# Patient Record
Sex: MALE | Race: WHITE | Employment: FULL TIME | URBAN - METROPOLITAN AREA
[De-identification: names, ages, dates, MRNs, and addresses within clinical notes are randomized per-mention and may not be internally consistent; named-entity substitution may affect disease eponyms.]

---

## 2017-01-17 ENCOUNTER — OFFICE VISIT (OUTPATIENT)
Dept: CARDIOLOGY CLINIC | Age: 42
End: 2017-01-17

## 2017-01-17 ENCOUNTER — CLINICAL SUPPORT (OUTPATIENT)
Dept: CARDIOLOGY CLINIC | Age: 42
End: 2017-01-17

## 2017-01-17 VITALS
HEIGHT: 71 IN | WEIGHT: 315 LBS | OXYGEN SATURATION: 96 % | SYSTOLIC BLOOD PRESSURE: 120 MMHG | RESPIRATION RATE: 18 BRPM | HEART RATE: 68 BPM | DIASTOLIC BLOOD PRESSURE: 58 MMHG | BODY MASS INDEX: 44.1 KG/M2

## 2017-01-17 DIAGNOSIS — R07.89 CHEST TIGHTNESS OR PRESSURE: ICD-10-CM

## 2017-01-17 DIAGNOSIS — I10 ESSENTIAL HYPERTENSION: ICD-10-CM

## 2017-01-17 DIAGNOSIS — R42 DIZZINESS: Primary | ICD-10-CM

## 2017-01-17 DIAGNOSIS — R42 DIZZINESS: ICD-10-CM

## 2017-01-17 NOTE — MR AVS SNAPSHOT
Visit Information Date & Time Provider Department Dept. Phone Encounter #  
 1/17/2017  3:15 PM Susanna Zamarripa, [address] [facility] [phone] [id number] Follow-up Instructions Routing History Follow-up and Disposition History Your Appointments 1/19/2017  3:30 PM  
ECHO CARDIOGRAMS 2D with ECHO, [facility]) Appt Note: $40CP REM Per Dr Italia Houser 5'11\", 331. 11LBS 2D ECHO COMPLETE ADULT (TTE) W OR WO CONTR [55371 CPT(R)] EKG TREADMILL STRESS TEST [32018 CPT(R)]  
 [facility]  
[phone] [address]  
  
    
 1/27/2017  3:30 PM  
STRESS TEST with STRESSECHO, [facility]) Appt Note: $40CP REM Per Dr Italia Houser 5'11\", 331. 11LBS 2D ECHO COMPLETE ADULT (TTE) W OR WO CONTR [67845 CPT(R)] EKG TREADMILL STRESS TEST [62412 CPT(R)]  
 [facility] [facility]  
[phone] [address]  
  
    
 3/10/2017  9:00 AM  
ROUTINE CARE with Ale Parker MD  
[facility]) Appt Note: 3m f/u  
 [address] [id number] [phone] [address] [facility] [address] Upcoming Health Maintenance Date Due DTaP/Tdap/Td series (2 - Td) 9/12/2024 Allergies as of 1/17/2017  Review Complete On: 1/17/2017 By: Susanna Zamarripa MD  
  
 Severity Noted Reaction Type Reactions Lisinopril  05/29/2013   Side Effect Other (comments)  
 dizziness Current Immunizations  Reviewed on 12/1/2014 Name Date Influenza Vaccine 10/21/2016 Influenza Vaccine (Quad) PF 12/11/2015, 12/1/2014 Influenza Vaccine Split 9/21/2012 TD Vaccine 7/25/2011 11:09 PM  
  
 Not reviewed this visit You Were Diagnosed With   
  
 Codes Comments Dizziness    -  Primary ICD-10-CM: W61 ICD-9-CM: 780.4 Essential hypertension     ICD-10-CM: I10 
ICD-9-CM: 401.9 Chest tightness or pressure     ICD-10-CM: R07.89 ICD-9-CM: 786.59 Vitals BP Pulse Resp Height(growth percentile) Weight(growth percentile) SpO2  
 120/58 (BP 1 Location: Right arm, BP Patient Position: Standing) 68 18 5' 11\" (1.803 m) 331 lb 11.2 oz (150.5 kg) 96% BMI Smoking Status 46.26 kg/m2 Former Smoker Vitals History BMI and BSA Data Body Mass Index Body Surface Area  
 46.26 kg/m 2 2.75 m 2 Preferred Pharmacy Pharmacy Name Phone [id number] [address] [phone] Your Updated Medication List  
  
   
This list is accurate as of: 1/17/17  4:16 PM.  Always use your most recent med list.  
  
  
  
  
 albuterol 90 mcg/actuation inhaler Commonly known as:  PROVENTIL HFA, VENTOLIN HFA, PROAIR HFA Take 2 Puffs by inhalation every four (4) hours as needed for Wheezing. ALPRAZolam 1 mg tablet Commonly known as:  XANAX  
TAKE 1 TABLET BY MOUTH 3 TIMES A DAY AS NEEDED FOR ANXIETY. NOT TO EXCEED REFILLS BEFORE 11-03-15  
  
 atenolol 100 mg tablet Commonly known as:  TENORMIN Take 1 Tab by mouth daily. For blood pressure  
  
 hydroCHLOROthiazide 25 mg tablet Commonly known as:  HYDRODIURIL Take 1 Tab by mouth daily. For blood pressure HYDROcodone-acetaminophen 5-325 mg per tablet Commonly known as:  Carmita Mura Take 2 Tabs by mouth every six (6) hours as needed for Pain. Max Daily Amount: 8 Tabs. promethazine-dextromethorphan 6.25-15 mg/5 mL syrup Commonly known as:  PROMETHAZINE-DM Take 5 mL by mouth four (4) times daily as needed for Cough. We Performed the Following AMB POC EKG ROUTINE W/ 12 LEADS, INTER & REP [87808 CPT(R)] To-Do List   
 01/17/2017   ECG:  CARDIAC HOLTER MONITOR, 24 HOURS   
  
 01/18/2017 ECHO:  2D ECHO COMPLETE ADULT (TTE) W OR WO CONTR   
  
 01/18/2017 ECG:  EKG TREADMILL STRESS TEST   
  
 02/10/2017 5:30 PM  
  Appointment with [facility] CT 2 at [facility] CT ([phone]) NON-CONTRAST STUDY: 1. [facility] films/images pertaining to the area of interest with you on the day of appointment. 2. Check in at registration at least 30 minutes before appt time unless you were instructed to do otherwise. 3. If you have to drink oral contrast please pick it up any weekday prior to your appointment, if you cannot please check in 2 hrs  before appt time. Referral Information Referral ID Referred By Referred To  
  
 [id number] [facility] Not Available Visits Status Start Date End Date 1 New Request 1/17/17 1/17/18 If your referral has a status of pending review or denied, additional information will be sent to support the outcome of this decision. Introducing [facility]! Samira Vasquez introduces [employer] patient portal. Now you can access parts of your medical record, email your doctor's office, and request medication refills online. 1. In your internet browser, go to [email or web address] 2. Click on the First Time User? Click Here link in the Sign In box. You will see the New Member Sign Up page. 3. Enter your [employer] Access Code exactly as it appears below. You will not need to use this code after youve completed the sign-up process. If you do not sign up before the expiration date, you must request a new code. · [employer] Access Code: [email or web address] Expires: 3/6/2017 10:42 AM 
 
4. Enter the last four digits of your Social Security Number (xxxx) and Date of Birth (mm/dd/yyyy) as indicated and click Submit. You will be taken to the next sign-up page. 5. Create a [employer] ID. This will be your [employer] login ID and cannot be changed, so think of one that is secure and easy to remember. 6. Create a [employer] password. You can change your password at any time. 7. Enter your Password Reset Question and Answer. This can be used at a later time if you forget your password. 8. Enter your e-mail address. You will receive e-mail notification when new information is available in [address] 9. Click Sign Up. You can now view and download portions of your medical record. 10. Click the Download Summary menu link to download a portable copy of your medical information. If you have questions, please visit the Frequently Asked Questions section of the [employer] website. Remember, [employer] is NOT to be used for urgent needs. For medical emergencies, dial 911. Now available from your iPhone and Android! Please provide this summary of care documentation to your next provider. Your primary care clinician is listed as [facility]. If you have any questions after today's visit, please call [phone].

## 2017-01-17 NOTE — PROGRESS NOTES
Sun Pink NP    Subjective/HPI:     Sofía Carlos is a 37 y.o. male is here for new patient consultation. The patient reports in November at least 2 significant episodes of acute onset of dizziness while operating a motor vehicle (work truck and the other time motorcycle). Patient also reports episodes of dizziness not as significant with routine activities of daily life; denies any specific triggers exacerbating or alleviating factors. Has been seen by primary care, CT head pending. Patient has been treated with hypertensive medication for greater than 1 year with no recent changes in medications and has been compliant in his therapy. Mr. Li Marcelo also reports episodes of anterior chest discomfort not associated with these dizziness episodes, not associated with exertional activities nor is his discomfort have any alleviating factors. Patient points to the midsternal chest with radiation to bilateral anterior chest wall without radiation to neck back or arms; denies associated dyspnea on exertion diaphoresis or nausea. There is a family history of heart disease, patient is male, hypertensive and has high cholesterol. Former smoker quit 8 years ago.     Patient Active Problem List    Diagnosis Date Noted    Dizziness 01/17/2017    Obstructive sleep apnea syndrome 12/06/2016    Acute chest pain 12/26/2012    Herpes zoster 12/26/2012    Plantar fasciitis 05/05/2010    BP (high blood pressure) 05/05/2010      Yanely Rodarte MD  Past Medical History   Diagnosis Date    BP (high blood pressure) 5/5/2010    Obstructive sleep apnea syndrome 12/6/2016    Plantar fasciitis 5/5/2010      Past Surgical History   Procedure Laterality Date    Hx vasectomy  2010     Dr. Mark Anthony Navarro Hx heent       ear tubes as a child     Allergies   Allergen Reactions    Lisinopril Other (comments)     dizziness      Family History   Problem Relation Age of Onset    Hypertension Maternal Grandfather       Current Outpatient Prescriptions   Medication Sig    atenolol (TENORMIN) 100 mg tablet Take 1 Tab by mouth daily. For blood pressure    ALPRAZolam (XANAX) 1 mg tablet TAKE 1 TABLET BY MOUTH 3 TIMES A DAY AS NEEDED FOR ANXIETY. NOT TO EXCEED REFILLS BEFORE 11-03-15    hydrochlorothiazide (HYDRODIURIL) 25 mg tablet Take 1 Tab by mouth daily. For blood pressure    albuterol (PROVENTIL HFA, VENTOLIN HFA, PROAIR HFA) 90 mcg/actuation inhaler Take 2 Puffs by inhalation every four (4) hours as needed for Wheezing.  HYDROcodone-acetaminophen (NORCO) 5-325 mg per tablet Take 2 Tabs by mouth every six (6) hours as needed for Pain. Max Daily Amount: 8 Tabs.  promethazine-dextromethorphan (PROMETHAZINE-DM) 6.25-15 mg/5 mL syrup Take 5 mL by mouth four (4) times daily as needed for Cough. No current facility-administered medications for this visit. Vitals:    01/17/17 1456 01/17/17 1506 01/17/17 1507   BP: 128/60 120/58 120/58   Pulse: 68     Resp: 18     SpO2: 96%     Weight: 331 lb 11.2 oz (150.5 kg)     Height: 5' 11\" (1.803 m)         I have reviewed the nurses notes, vitals, problem list, allergy list, medical history, family, social history and medications. Review of Symptoms:    General: Pt denies excessive weight gain or loss. Pt is able to conduct ADL's  HEENT: Denies blurred vision, headaches, epistaxis and difficulty swallowing. Respiratory: Denies shortness of breath, CHAPIN, wheezing or stridor. Cardiovascular: Denies exertional chest pain, palpitations, edema or PND  Gastrointestinal: Denies poor appetite, indigestion, abdominal pain or blood in stool  Musculoskeletal: Denies pain or swelling from muscles or joints  Neurologic: Denies tremor, paresthesias.  + Episodes of dizziness remains asymptomatic ×1 month  Skin: Denies rash, itching or texture change.   Urinary: Denies difficulty with urination  Psyc/Social concerns: Denies depression      Physical Exam:      General: Well developed, obese cooperative, alert in no acute distress, appears states age. HEENT: Supple, No carotid bruits, no JVD, trach is midline. PERRL, EOM intact  Heart:  Normal S1/S2 negative S3 or S4. Regular, no murmur, gallop or rub.   Respiratory: Clear bilaterally x 4, no wheezing or rales  Abdomen:   Soft, non-tender, no masses, bowel sounds are active.   Extremities:  No edema, normal cap refill, no cyanosis, atraumatic. Neuro: A&Ox3, speech clear, gait stable. Skin: Skin color is normal. No rashes or lesions.  Non diaphoretic  Vascular: 2+ pulses symmetric in all extremities    Cardiographics    ECG: Sinus rhythm  Results for orders placed or performed during the hospital encounter of 09/12/14   EKG, 12 LEAD, INITIAL   Result Value Ref Range    Ventricular Rate 75 BPM    Atrial Rate 75 BPM    P-R Interval 160 ms    QRS Duration 100 ms    Q-T Interval 364 ms    QTC Calculation (Bezet) 406 ms    Calculated P Axis 40 degrees    Calculated R Axis -2 degrees    Calculated T Axis 13 degrees    Diagnosis       Normal sinus rhythm  Minimal voltage criteria for LVH, may be normal variant    When compared with ECG of 29-APR-2014 13:15,  No significant change was found  Confirmed by Mallika Croft ([id number]) on 9/12/2014 1:36:29 PM         Cardiology Labs:  Lab Results   Component Value Date/Time    Cholesterol, total 201 12/06/2016 11:13 AM    HDL Cholesterol 45 12/06/2016 11:13 AM    LDL, calculated 114 12/06/2016 11:13 AM    Triglyceride 210 12/06/2016 11:13 AM       Lab Results   Component Value Date/Time    Sodium 142 12/06/2016 11:13 AM    Potassium 4.8 12/06/2016 11:13 AM    Chloride 98 12/06/2016 11:13 AM    CO2 24 12/06/2016 11:13 AM    Anion gap 9 09/12/2014 01:15 PM    Glucose 95 12/06/2016 11:13 AM    BUN 11 12/06/2016 11:13 AM    Creatinine 1.11 12/06/2016 11:13 AM    BUN/Creatinine ratio 10 12/06/2016 11:13 AM    GFR est AA 96 12/06/2016 11:13 AM    GFR est non-AA 83 12/06/2016 11:13 AM    Calcium 10.2 12/06/2016 11:13 AM Bilirubin, total 0.6 12/06/2016 11:13 AM    ALT 32 12/06/2016 11:13 AM    AST 21 12/06/2016 11:13 AM    Alk. phosphatase 73 12/06/2016 11:13 AM    Protein, total 7.1 12/06/2016 11:13 AM    Albumin 4.6 12/06/2016 11:13 AM    Globulin 3.1 09/12/2014 01:15 PM    A-G Ratio 1.8 12/06/2016 11:13 AM           Assessment:     Assessment:      Peter Nolasco was seen today for dizziness. Diagnoses and all orders for this visit:    Dizziness  -     AMB POC EKG ROUTINE W/ 12 LEADS, INTER & REP  -     2D ECHO COMPLETE ADULT (TTE) W OR WO CONTR; Future  -     HOLTER MONITOR, 24 HOURS, Clinic Performed; Future  -     EKG TREADMILL STRESS TEST; Future    Essential hypertension  -     2D ECHO COMPLETE ADULT (TTE) W OR WO CONTR; Future  -     HOLTER MONITOR, 24 HOURS, Clinic Performed; Future  -     EKG TREADMILL STRESS TEST; Future    Chest tightness or pressure  -     2D ECHO COMPLETE ADULT (TTE) W OR WO CONTR; Future  -     HOLTER MONITOR, 24 HOURS, Clinic Performed; Future  -     EKG TREADMILL STRESS TEST; Future      Specialty Problems        Cardiology Problems    BP (high blood pressure)              ICD-10-CM ICD-9-CM    1. Dizziness R42 780.4 AMB POC EKG ROUTINE W/ 12 LEADS, INTER & REP      2D ECHO COMPLETE ADULT (TTE) W OR WO CONTR      CARDIAC HOLTER MONITOR, 24 HOURS      EKG TREADMILL STRESS TEST   2. Essential hypertension I10 401.9 2D ECHO COMPLETE ADULT (TTE) W OR WO CONTR      CARDIAC HOLTER MONITOR, 24 HOURS      EKG TREADMILL STRESS TEST   3.  Chest tightness or pressure R07.89 786.59 2D ECHO COMPLETE ADULT (TTE) W OR WO CONTR      CARDIAC HOLTER MONITOR, 24 HOURS      EKG TREADMILL STRESS TEST     Orders Placed This Encounter    AMB POC EKG ROUTINE W/ 12 LEADS, INTER & REP     Order Specific Question:   Reason for Exam:     Answer:   ROUTINE    HOLTER MONITOR, 24 HOURS, Clinic Performed     Standing Status:   Future     Number of Occurrences:   1     Standing Expiration Date:   7/17/2017     Order Specific Question: Reason for Exam:     Answer:   HTN dizziness    EKG TREADMILL STRESS TEST     Standing Status:   Future     Standing Expiration Date:   7/17/2017     Order Specific Question:   Reason for Exam:     Answer:   chest tightness    2D ECHO COMPLETE ADULT (TTE) W OR WO CONTR     Standing Status:   Future     Standing Expiration Date:   7/17/2017     Order Specific Question:   Reason for Exam:     Answer:   HTn dizziness     Order Specific Question:   Contrast Enhancement (Bubble Study, Definity, Optison) may be used if criteria listed in established evidence-based protocol has been identified. Answer:   Yes       PLAN:    Patient is a 45-year-old male with reported episodes of acute onset of dizziness towards the end of last year. Asymptomatic for 1 month. Will evaluate  Holter monitor as patient is on beta-blocker as well as echocardiogram to rule out valvular pathology. Nonexertional chest tightness with cardiac risk factors of male, hypertension, obesity, elevated LDL, family medical history of heart disease and former smoker will perform routine exercise stress test.  Follow-up when testing complete.     Ashu De Paz MD

## 2017-01-19 ENCOUNTER — CLINICAL SUPPORT (OUTPATIENT)
Dept: CARDIOLOGY CLINIC | Age: 42
End: 2017-01-19

## 2017-01-19 DIAGNOSIS — R42 DIZZINESS: ICD-10-CM

## 2017-01-19 DIAGNOSIS — R07.89 CHEST TIGHTNESS OR PRESSURE: ICD-10-CM

## 2017-01-19 DIAGNOSIS — R07.9 ACUTE CHEST PAIN: ICD-10-CM

## 2017-01-19 DIAGNOSIS — I10 ESSENTIAL HYPERTENSION: ICD-10-CM

## 2017-01-19 NOTE — PROGRESS NOTES
The procedure was explained to the patient, the patient expresses understanding of what will happen, and agrees to the procedure.

## 2017-01-27 ENCOUNTER — CLINICAL SUPPORT (OUTPATIENT)
Dept: CARDIOLOGY CLINIC | Age: 42
End: 2017-01-27

## 2017-01-27 ENCOUNTER — TELEPHONE (OUTPATIENT)
Dept: CARDIOLOGY CLINIC | Age: 42
End: 2017-01-27

## 2017-01-27 DIAGNOSIS — R42 DIZZINESS: ICD-10-CM

## 2017-01-27 DIAGNOSIS — R07.89 CHEST TIGHTNESS OR PRESSURE: ICD-10-CM

## 2017-01-27 DIAGNOSIS — I10 ESSENTIAL HYPERTENSION: ICD-10-CM

## 2017-01-27 NOTE — TELEPHONE ENCOUNTER
----- Message from Nata Bernard MD sent at 1/27/2017  1:10 PM EST -----  Holter normalplease advise.

## 2017-01-27 NOTE — TELEPHONE ENCOUNTER
----- Message from Hira Tarango MD sent at 1/26/2017  4:35 PM EST -----  Echo normal.  Follow-up after all tests.

## 2017-02-02 NOTE — PROGRESS NOTES
Spoke with patient  Verified patient with 2 patient identifier  Stress test normal.  If doing well, work hard on diet and gradually increasing exercise to 30-40 minutes for 5 times a week and follow-up in 6 months if more episodes of near fainting or fainting, follow-up sooner. Patient verbalized understanding.

## 2017-02-02 NOTE — PROGRESS NOTES
Stress test normal.  If doing well, work hard on diet and gradually increasing exercise to 30-40 minutes for 5 times a week and follow-up in 6 months if more episodes of near fainting or fainting, follow-up sooner

## 2017-02-02 NOTE — PROGRESS NOTES
Call patient left message on voicemail to return call reference test results. First contact ph# is [facility] said call  [phone].

## 2017-03-21 ENCOUNTER — DOCUMENTATION ONLY (OUTPATIENT)
Dept: FAMILY MEDICINE CLINIC | Age: 42
End: 2017-03-21

## 2017-03-21 RX ORDER — ALPRAZOLAM 1 MG/1
TABLET ORAL
Qty: 50 TAB | Refills: 0 | OUTPATIENT
Start: 2017-03-21 | End: 2017-04-26 | Stop reason: SDUPTHER

## 2017-03-21 NOTE — TELEPHONE ENCOUNTER
----- Message from Fred Hummel sent at 3/21/2017  9:43 AM EDT -----  Regarding: Dr. Cummins/prescription refill request  Pt is completely out of his medication, Xanax and needs a prescription refill. Pt has been out of the medication for over a week. Pt was told by his pharmacy that a refill request had been sent, but that request was never received by any of the office staff. Pt can be reached at  [phone].

## 2017-04-04 RX ORDER — HYDROCHLOROTHIAZIDE 25 MG/1
25 TABLET ORAL DAILY
Qty: 90 TAB | Refills: 3 | Status: SHIPPED | OUTPATIENT
Start: 2017-04-04 | End: 2018-06-01 | Stop reason: SDUPTHER

## 2017-04-26 RX ORDER — ALPRAZOLAM 1 MG/1
TABLET ORAL
Qty: 50 TAB | Refills: 0 | OUTPATIENT
Start: 2017-04-26 | End: 2017-05-26 | Stop reason: SDUPTHER

## 2017-04-26 NOTE — TELEPHONE ENCOUNTER
Called pt to inform that refill request was sent to the wrong provider so that's why it was denied. Will send to [address] for refill.  Left VM

## 2017-05-30 RX ORDER — ALPRAZOLAM 1 MG/1
TABLET ORAL
Qty: 50 TAB | Refills: 0 | OUTPATIENT
Start: 2017-05-30 | End: 2017-07-07 | Stop reason: SDUPTHER

## 2017-06-22 ENCOUNTER — OFFICE VISIT (OUTPATIENT)
Dept: FAMILY MEDICINE CLINIC | Age: 42
End: 2017-06-22

## 2017-06-22 VITALS
HEIGHT: 71 IN | DIASTOLIC BLOOD PRESSURE: 85 MMHG | RESPIRATION RATE: 18 BRPM | OXYGEN SATURATION: 95 % | TEMPERATURE: 97.9 F | HEART RATE: 77 BPM | SYSTOLIC BLOOD PRESSURE: 137 MMHG | BODY MASS INDEX: 44.1 KG/M2 | WEIGHT: 315 LBS

## 2017-06-22 DIAGNOSIS — Z00.00 ROUTINE GENERAL MEDICAL EXAMINATION AT A HEALTH CARE FACILITY: ICD-10-CM

## 2017-06-22 DIAGNOSIS — I10 ESSENTIAL HYPERTENSION: Primary | ICD-10-CM

## 2017-06-22 RX ORDER — HYDROCODONE BITARTRATE AND ACETAMINOPHEN 5; 325 MG/1; MG/1
2 TABLET ORAL
Qty: 80 TAB | Refills: 0 | Status: SHIPPED | OUTPATIENT
Start: 2017-06-22 | End: 2017-12-21 | Stop reason: ALTCHOICE

## 2017-06-22 NOTE — MR AVS SNAPSHOT
Visit Information Date & Time Provider Department Dept. Phone Encounter #  
 6/22/2017  3:45 PM David Collins MD [facility] [phone] [id number] Follow-up Instructions Return in about 6 months (around 12/22/2017). Upcoming Health Maintenance Date Due INFLUENZA AGE 9 TO ADULT 8/1/2017 DTaP/Tdap/Td series (2 - Td) 9/12/2024 Allergies as of 6/22/2017  Review Complete On: 6/22/2017 By: David Collins MD  
  
 Severity Noted Reaction Type Reactions Lisinopril  05/29/2013   Side Effect Other (comments)  
 dizziness Current Immunizations  Reviewed on 12/1/2014 Name Date Influenza Vaccine 10/21/2016 Influenza Vaccine (Quad) PF 12/11/2015, 12/1/2014 Influenza Vaccine Split 9/21/2012 TD Vaccine 7/25/2011 11:09 PM  
  
 Not reviewed this visit You Were Diagnosed With   
  
 Codes Comments Essential hypertension    -  Primary ICD-10-CM: I10 
ICD-9-CM: 401.9 Routine general medical examination at a health care facility     ICD-10-CM: Z00.00 ICD-9-CM: V70.0 Vitals BP Pulse Temp Resp Height(growth percentile) Weight(growth percentile) 137/85 77 97.9 °F (36.6 °C) (Oral) 18 5' 11\" (1.803 m) 315 lb 6.4 oz (143.1 kg) SpO2 BMI Smoking Status 95% 43.99 kg/m2 Former Smoker Vitals History BMI and BSA Data Body Mass Index Body Surface Area 43.99 kg/m 2 2.68 m 2 Preferred Pharmacy Pharmacy Name Phone [facility][id number]- [place], [place] - [address] [phone] Your Updated Medication List  
  
   
This list is accurate as of: 6/22/17  4:27 PM.  Always use your most recent med list.  
  
  
  
  
 albuterol 90 mcg/actuation inhaler Commonly known as:  PROVENTIL HFA, VENTOLIN HFA, PROAIR HFA Take 2 Puffs by inhalation every four (4) hours as needed for Wheezing. ALPRAZolam 1 mg tablet Commonly known as:  [address] TAKE 1 TABLET BY MOUTH 3 TIMES A DAY AS NEEDED FOR ANXIETY. NOT TO EXCEED REFILLS BEFORE 11-03-15  
  
 atenolol 100 mg tablet Commonly known as:  TENORMIN Take 1 Tab by mouth daily. For blood pressure  
  
 hydroCHLOROthiazide 25 mg tablet Commonly known as:  HYDRODIURIL Take 1 Tab by mouth daily. For blood pressure HYDROcodone-acetaminophen 5-325 mg per tablet Commonly known as:  [address] Take 2 Tabs by mouth every six (6) hours as needed for Pain. Max Daily Amount: 8 Tabs. Prescriptions Printed Refills HYDROcodone-acetaminophen (NORCO) 5-325 mg per tablet 0 Sig: Take 2 Tabs by mouth every six (6) hours as needed for Pain. Max Daily Amount: 8 Tabs. Class: Print Route: Oral  
  
We Performed the Following CBC WITH AUTOMATED DIFF [54552 CPT(R)] LIPID PANEL [50590 CPT(R)] METABOLIC PANEL, BASIC [28970 CPT(R)] METABOLIC PANEL, COMPREHENSIVE [03079 CPT(R)] Follow-up Instructions Return in about 6 months (around 12/22/2017). Introducing [facility]! [address] [place] [address] introduces [employer] patient portal. Now you can access parts of your medical record, email your doctor's office, and request medication refills online. 1. In your internet browser, go to [email or web address] 2. Click on the First Time User? Click Here link in the Sign In box. You will see the New Member Sign Up page. 3. Enter your [employer] Access Code exactly as it appears below. You will not need to use this code after youve completed the sign-up process. If you do not sign up before the expiration date, you must request a new code. · [employer] Access Code: [email or web address] Expires: 9/20/2017  4:27 PM 
 
4. Enter the last four digits of your Social Security Number (xxxx) and Date of Birth (mm/dd/yyyy) as indicated and click Submit. You will be taken to the next sign-up page. 5. Create a [employer] ID.  This will be your [employer] login ID and cannot be changed, so think of one that is secure and easy to remember. 6. Create a [employer] password. You can change your password at any time. 7. Enter your Password Reset Question and Answer. This can be used at a later time if you forget your password. 8. Enter your e-mail address. You will receive e-mail notification when new information is available in [address] 9. Click Sign Up. You can now view and download portions of your medical record. 10. Click the Download Summary menu link to download a portable copy of your medical information. If you have questions, please visit the Frequently Asked Questions section of the [employer] website. Remember, [employer] is NOT to be used for urgent needs. For medical emergencies, dial 911. Now available from your iPhone and Android! Please provide this summary of care documentation to your next provider. Your primary care clinician is listed as Cathy Tripathi. If you have any questions after today's visit, please call [phone]. [FreeTextEntry1] : Read bone age as closest to 7 years 10 months; 2nd and 5th middle phalanges appear atypical and two of carpal bones fused.  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

## 2017-06-22 NOTE — PROGRESS NOTES
HISTORY OF PRESENT ILLNESS  Chayito Vick is a 40 y.o. male. HPI In for recheck. No further dizzy spells. Had normal echo, holter, and stress test. No dyspnea. ROS    Physical Exam   Constitutional: He appears well-developed and well-nourished. HENT:   Right Ear: External ear normal.   Left Ear: External ear normal.   Mouth/Throat: Oropharynx is clear and moist.   Neck: No thyromegaly present. Cardiovascular: Normal rate, regular rhythm, normal heart sounds and intact distal pulses. Pulmonary/Chest: Effort normal and breath sounds normal. No respiratory distress. He has no wheezes. Abdominal: Soft. Bowel sounds are normal. He exhibits no distension and no mass. There is no tenderness. There is no guarding. Musculoskeletal: Normal range of motion. He exhibits no edema. Lymphadenopathy:     He has no cervical adenopathy. Nursing note and vitals reviewed. ASSESSMENT and PLAN  Orders Placed This Encounter    CBC WITH AUTOMATED DIFF    LIPID PANEL    METABOLIC PANEL, COMPREHENSIVE    HYDROcodone-acetaminophen (NORCO) 5-325 mg per tablet     Severo Postal was seen today for hypertension. Diagnoses and all orders for this visit:    Essential hypertension  -     CBC WITH AUTOMATED DIFF  -     LIPID PANEL  -     METABOLIC PANEL, COMPREHENSIVE  -     Cancel: METABOLIC PANEL, BASIC    Routine general medical examination at a health care facility  -     HYDROcodone-acetaminophen (NORCO) 5-325 mg per tablet; Take 2 Tabs by mouth every six (6) hours as needed for Pain. Max Daily Amount: 8 Tabs. Other orders  -     Cancel: 10-DRUG SCREEN W/CONF      Follow-up Disposition:  Return in about 6 months (around 12/22/2017).

## 2017-06-22 NOTE — PROGRESS NOTES
Chief Complaint   Patient presents with    Hypertension     Pt here for 6 month follow up. Pt reports having no more dizzy spells. Last spell was back in October , 2016.

## 2017-06-23 LAB
ALBUMIN SERPL-MCNC: 4.7 G/DL (ref 3.5–5.5)
ALBUMIN/GLOB SERPL: 1.9 {RATIO} (ref 1.2–2.2)
ALP SERPL-CCNC: 72 IU/L (ref 39–117)
ALT SERPL-CCNC: 40 IU/L (ref 0–44)
AST SERPL-CCNC: 29 IU/L (ref 0–40)
BASOPHILS # BLD AUTO: 0.1 X10E3/UL (ref 0–0.2)
BASOPHILS NFR BLD AUTO: 1 %
BILIRUB SERPL-MCNC: 0.8 MG/DL (ref 0–1.2)
BUN SERPL-MCNC: 12 MG/DL (ref 6–24)
BUN/CREAT SERPL: 12 (ref 9–20)
CALCIUM SERPL-MCNC: 9.9 MG/DL (ref 8.7–10.2)
CHLORIDE SERPL-SCNC: 98 MMOL/L (ref 96–106)
CHOLEST SERPL-MCNC: 182 MG/DL (ref 100–199)
CO2 SERPL-SCNC: 25 MMOL/L (ref 18–29)
CREAT SERPL-MCNC: 1.01 MG/DL (ref 0.76–1.27)
EOSINOPHIL # BLD AUTO: 0.3 X10E3/UL (ref 0–0.4)
EOSINOPHIL NFR BLD AUTO: 3 %
ERYTHROCYTE [DISTWIDTH] IN BLOOD BY AUTOMATED COUNT: 14.5 % (ref 12.3–15.4)
GLOBULIN SER CALC-MCNC: 2.5 G/DL (ref 1.5–4.5)
GLUCOSE SERPL-MCNC: 93 MG/DL (ref 65–99)
HCT VFR BLD AUTO: 45.5 % (ref 37.5–51)
HDLC SERPL-MCNC: 42 MG/DL
HGB BLD-MCNC: 15.6 G/DL (ref 12.6–17.7)
IMM GRANULOCYTES # BLD: 0.1 X10E3/UL (ref 0–0.1)
IMM GRANULOCYTES NFR BLD: 1 %
INTERPRETATION, 910389: NORMAL
LDLC SERPL CALC-MCNC: 72 MG/DL (ref 0–99)
LYMPHOCYTES # BLD AUTO: 3 X10E3/UL (ref 0.7–3.1)
LYMPHOCYTES NFR BLD AUTO: 26 %
MCH RBC QN AUTO: 29.4 PG (ref 26.6–33)
MCHC RBC AUTO-ENTMCNC: 34.3 G/DL (ref 31.5–35.7)
MCV RBC AUTO: 86 FL (ref 79–97)
MONOCYTES # BLD AUTO: 0.9 X10E3/UL (ref 0.1–0.9)
MONOCYTES NFR BLD AUTO: 7 %
NEUTROPHILS # BLD AUTO: 7.4 X10E3/UL (ref 1.4–7)
NEUTROPHILS NFR BLD AUTO: 62 %
PLATELET # BLD AUTO: 266 X10E3/UL (ref 150–379)
POTASSIUM SERPL-SCNC: 4.4 MMOL/L (ref 3.5–5.2)
PROT SERPL-MCNC: 7.2 G/DL (ref 6–8.5)
RBC # BLD AUTO: 5.3 X10E6/UL (ref 4.14–5.8)
SODIUM SERPL-SCNC: 140 MMOL/L (ref 134–144)
TRIGL SERPL-MCNC: 338 MG/DL (ref 0–149)
VLDLC SERPL CALC-MCNC: 68 MG/DL (ref 5–40)
WBC # BLD AUTO: 11.7 X10E3/UL (ref 3.4–10.8)

## 2017-07-07 ENCOUNTER — TELEPHONE (OUTPATIENT)
Dept: FAMILY MEDICINE CLINIC | Age: 42
End: 2017-07-07

## 2017-07-07 RX ORDER — ALPRAZOLAM 1 MG/1
TABLET ORAL
Qty: 50 TAB | Refills: 0 | Status: SHIPPED | OUTPATIENT
Start: 2017-07-07 | End: 2017-08-15 | Stop reason: SDUPTHER

## 2017-08-15 RX ORDER — ALPRAZOLAM 1 MG/1
TABLET ORAL
Qty: 50 TAB | Refills: 0 | Status: SHIPPED | OUTPATIENT
Start: 2017-08-15 | End: 2017-09-13 | Stop reason: SDUPTHER

## 2017-08-16 ENCOUNTER — DOCUMENTATION ONLY (OUTPATIENT)
Dept: FAMILY MEDICINE CLINIC | Age: 42
End: 2017-08-16

## 2017-09-12 RX ORDER — ALPRAZOLAM 1 MG/1
TABLET ORAL
Qty: 50 TAB | Refills: 0 | Status: CANCELLED | OUTPATIENT
Start: 2017-09-12

## 2017-09-13 RX ORDER — ALPRAZOLAM 1 MG/1
TABLET ORAL
Qty: 50 TAB | Refills: 0 | Status: SHIPPED | OUTPATIENT
Start: 2017-09-13 | End: 2017-10-13 | Stop reason: SDUPTHER

## 2017-09-13 NOTE — TELEPHONE ENCOUNTER
----- Message from Arun Richey sent at 9/13/2017  1:47 PM EDT -----  Regarding: Dr. Cummins/prescription refill request  Pt needs a refill for Lorazepam sent to the [facility]  pharmacy located on [address] [address] (pharmacy info is in chart) Pt is completely out of the medication. Pt can be reached at [phone].

## 2017-09-13 NOTE — TELEPHONE ENCOUNTER
Medication not seen in med list. Called pt back.  Pt wanted refill on alprazolam. Refill submitted to

## 2017-09-14 ENCOUNTER — TELEPHONE (OUTPATIENT)
Dept: FAMILY MEDICINE CLINIC | Age: 42
End: 2017-09-14

## 2017-10-16 RX ORDER — CARVEDILOL 25 MG/1
25 TABLET ORAL 2 TIMES DAILY WITH MEALS
Qty: 180 TAB | Refills: 0 | Status: SHIPPED | OUTPATIENT
Start: 2017-10-16 | End: 2017-12-21 | Stop reason: SDUPTHER

## 2017-10-16 RX ORDER — ALPRAZOLAM 1 MG/1
TABLET ORAL
Qty: 50 TAB | Refills: 0 | Status: SHIPPED | OUTPATIENT
Start: 2017-10-16 | End: 2017-11-22 | Stop reason: SDUPTHER

## 2017-10-17 ENCOUNTER — DOCUMENTATION ONLY (OUTPATIENT)
Dept: FAMILY MEDICINE CLINIC | Age: 42
End: 2017-10-17

## 2017-11-22 RX ORDER — ALPRAZOLAM 1 MG/1
TABLET ORAL
Qty: 50 TAB | Refills: 0 | OUTPATIENT
Start: 2017-11-22 | End: 2017-12-21 | Stop reason: SDUPTHER

## 2017-11-24 RX ORDER — ALPRAZOLAM 1 MG/1
TABLET ORAL
Qty: 50 TAB | Refills: 0 | Status: CANCELLED | OUTPATIENT
Start: 2017-11-24

## 2017-12-21 ENCOUNTER — OFFICE VISIT (OUTPATIENT)
Dept: FAMILY MEDICINE CLINIC | Age: 42
End: 2017-12-21

## 2017-12-21 VITALS
SYSTOLIC BLOOD PRESSURE: 114 MMHG | RESPIRATION RATE: 14 BRPM | HEART RATE: 90 BPM | DIASTOLIC BLOOD PRESSURE: 65 MMHG | OXYGEN SATURATION: 96 % | HEIGHT: 71 IN | WEIGHT: 315 LBS | BODY MASS INDEX: 44.1 KG/M2 | TEMPERATURE: 96.4 F

## 2017-12-21 DIAGNOSIS — I10 ESSENTIAL HYPERTENSION: Primary | ICD-10-CM

## 2017-12-21 DIAGNOSIS — Z00.00 ROUTINE GENERAL MEDICAL EXAMINATION AT A HEALTH CARE FACILITY: ICD-10-CM

## 2017-12-21 DIAGNOSIS — Z23 ENCOUNTER FOR IMMUNIZATION: ICD-10-CM

## 2017-12-21 DIAGNOSIS — F41.9 ANXIETY: ICD-10-CM

## 2017-12-21 RX ORDER — CARVEDILOL 25 MG/1
25 TABLET ORAL 2 TIMES DAILY WITH MEALS
Qty: 180 TAB | Refills: 3 | Status: SHIPPED | OUTPATIENT
Start: 2017-12-21 | End: 2019-01-31 | Stop reason: SDUPTHER

## 2017-12-21 RX ORDER — ALPRAZOLAM 1 MG/1
TABLET ORAL
Qty: 50 TAB | Refills: 5 | Status: SHIPPED | OUTPATIENT
Start: 2017-12-21 | End: 2018-07-06 | Stop reason: SDUPTHER

## 2017-12-21 RX ORDER — HYDROCODONE BITARTRATE AND ACETAMINOPHEN 5; 325 MG/1; MG/1
2 TABLET ORAL
Qty: 80 TAB | Refills: 0 | Status: SHIPPED | OUTPATIENT
Start: 2017-12-21 | End: 2018-08-21 | Stop reason: ALTCHOICE

## 2017-12-21 NOTE — MR AVS SNAPSHOT
Visit Information Date & Time Provider Department Dept. Phone Encounter #  
 12/21/2017  3:45 PM Valdemar Lester MD [facility] [phone] [id number] Follow-up Instructions Return in about 6 months (around 6/21/2018). Upcoming Health Maintenance Date Due Influenza Age 5 to Adult 8/1/2017 DTaP/Tdap/Td series (2 - Td) 9/12/2024 Allergies as of 12/21/2017  Review Complete On: 12/21/2017 By: Ruthann Painting LPN Severity Noted Reaction Type Reactions Lisinopril  05/29/2013   Side Effect Other (comments)  
 dizziness Current Immunizations  Reviewed on 12/1/2014 Name Date Influenza Vaccine 10/21/2016 Influenza Vaccine (Quad) PF  Incomplete, 12/11/2015, 12/1/2014 Influenza Vaccine Split 9/21/2012 TD Vaccine 7/25/2011 11:09 PM  
  
 Not reviewed this visit You Were Diagnosed With   
  
 Codes Comments Essential hypertension    -  Primary ICD-10-CM: I10 
ICD-9-CM: 401.9 Routine general medical examination at a health care facility     ICD-10-CM: Z00.00 ICD-9-CM: V70.0 Encounter for immunization     ICD-10-CM: Q88 ICD-9-CM: V03.89 Vitals BP Pulse Temp Resp Height(growth percentile) Weight(growth percentile) 114/65 90 96.4 °F (35.8 °C) (Oral) 14 5' 11\" (1.803 m) 330 lb 9.6 oz (150 kg) SpO2 BMI Smoking Status 96% 46.11 kg/m2 Former Smoker BMI and BSA Data Body Mass Index Body Surface Area  
 46.11 kg/m 2 2.74 m 2 Preferred Pharmacy Pharmacy Name Phone [facility]Y [id number], [place] - [address] [phone] Your Updated Medication List  
  
   
This list is accurate as of: 12/21/17  4:15 PM.  Always use your most recent med list.  
  
  
  
  
 ALPRAZolam 1 mg tablet Commonly known as:  XANAX  
TAKE 1 TABLET BY MOUTH 3 TIMES A DAY AS NEEDED FOR ANXIETY. NOT TO EXCEED REFILLS BEFORE 11-03-15 carvedilol 25 mg tablet Commonly known as:  Gillermina Begun Take 1 Tab by mouth two (2) times daily (with meals). ATTN Pharmacist  Please Notify and educate patient of Change in Medication  
  
 hydroCHLOROthiazide 25 mg tablet Commonly known as:  HYDRODIURIL Take 1 Tab by mouth daily. For blood pressure HYDROcodone-acetaminophen 5-325 mg per tablet Commonly known as:  Daria Parisian Take 2 Tabs by mouth every six (6) hours as needed for Pain. Max Daily Amount: 8 Tabs. Prescriptions Printed Refills HYDROcodone-acetaminophen (NORCO) 5-325 mg per tablet 0 Sig: Take 2 Tabs by mouth every six (6) hours as needed for Pain. Max Daily Amount: 8 Tabs. Class: Print Route: Oral  
  
Prescriptions Sent to Pharmacy Refills  
 carvedilol (COREG) 25 mg tablet 3 Sig: Take 1 Tab by mouth two (2) times daily (with meals). ATTN Pharmacist  Please Notify and educate patient of Change in Medication Class: Normal  
 Pharmacy: [facility]/pharmacy [facility] [address] [facility] #: [phone] Route: Oral  
  
We Performed the Following CBC WITH AUTOMATED DIFF [97694 CPT(R)] INFLUENZA VIRUS VAC QUAD,SPLIT,PRESV FREE SYRINGE IM [id number] CPT(R)] LIPID PANEL [51805 CPT(R)] METABOLIC PANEL, COMPREHENSIVE [61447 CPT(R)] [place] IMMUNIZ ADMIN,1 SINGLE/COMB VAC/TOXOID [id number] CPT(R)] Follow-up Instructions Return in about 6 months (around 6/21/2018). Patient Instructions Vaccine Information Statement Influenza (Flu) Vaccine (Inactivated or Recombinant): What you need to know Many Vaccine Information Statements are available in Monegasque and other languages. See www.immunize.org/vis Hojas de Información Sobre Vacunas están disponibles en Español y en muchos otros idiomas. Visite www.immunize.org/vis 1. Why get vaccinated?  
 
Influenza (flu) is a contagious disease that spreads around the [facility] every year, usually between October and May. Flu is caused by influenza viruses, and is spread mainly by coughing, sneezing, and close contact. Anyone can get flu. Flu strikes suddenly and can last several days. Symptoms vary by age, but can include: 
 fever/chills  sore throat  muscle aches  fatigue  cough  headache  runny or stuffy nose Flu can also lead to pneumonia and blood infections, and cause diarrhea and seizures in children. If you have a medical condition, such as heart or lung disease, flu can make it worse. Flu is more dangerous for some people. Infants and young children, people 72years of age and older, pregnant women, and people with certain health conditions or a weakened immune system are at greatest risk. Each year thousands of people in the [facility] die from flu, and many more are hospitalized. Flu vaccine can: 
 keep you from getting flu, 
 make flu less severe if you do get it, and 
 keep you from spreading flu to your family and other people. 2. Inactivated and recombinant flu vaccines A dose of flu vaccine is recommended every flu season. Children 6 months through 6years of age may need two doses during the same flu season. Everyone else needs only one dose each flu season. Some inactivated flu vaccines contain a very small amount of a mercury-based preservative called thimerosal. Studies have not shown thimerosal in vaccines to be harmful, but flu vaccines that do not contain thimerosal are available. There is no live flu virus in flu shots. They cannot cause the flu. There are many flu viruses, and they are always changing. Each year a new flu vaccine is made to protect against three or four viruses that are likely to cause disease in the upcoming flu season. But even when the vaccine doesnt exactly match these viruses, it may still provide some protection Flu vaccine cannot prevent:  flu that is caused by a virus not covered by the vaccine, or 
 illnesses that look like flu but are not. It takes about 2 weeks for protection to develop after vaccination, and protection lasts through the flu season. 3. Some people should not get this vaccine Tell the person who is giving you the vaccine:  If you have any severe, life-threatening allergies. If you ever had a life-threatening allergic reaction after a dose of flu vaccine, or have a severe allergy to any part of this vaccine, you may be advised not to get vaccinated. Most, but not all, types of flu vaccine contain a small amount of egg protein.  If you ever had Guillain-Barré Syndrome (also called GBS). Some people with a history of GBS should not get this vaccine. This should be discussed with your doctor.  If you are not feeling well. It is usually okay to get flu vaccine when you have a mild illness, but you might be asked to come back when you feel better. 4. Risks of a vaccine reaction With any medicine, including vaccines, there is a chance of reactions. These are usually mild and go away on their own, but serious reactions are also possible. Most people who get a flu shot do not have any problems with it. Minor problems following a flu shot include:  
 soreness, redness, or swelling where the shot was given  hoarseness  sore, red or itchy eyes  cough  fever  aches  headache  itching  fatigue If these problems occur, they usually begin soon after the shot and last 1 or 2 days. More serious problems following a flu shot can include the following:  There may be a small increased risk of Guillain-Barré Syndrome (GBS) after inactivated flu vaccine. This risk has been estimated at 1 or 2 additional cases per million people vaccinated. This is much lower than the risk of severe complications from flu, which can be prevented by flu vaccine.  Young children who get the flu shot along with pneumococcal vaccine (PCV13) and/or DTaP vaccine at the same time might be slightly more likely to have a seizure caused by fever. Ask your doctor for more information. Tell your doctor if a child who is getting flu vaccine has ever had a seizure. Problems that could happen after any injected vaccine:  People sometimes faint after a medical procedure, including vaccination. Sitting or lying down for about 15 minutes can help prevent fainting, and injuries caused by a fall. Tell your doctor if you feel dizzy, or have vision changes or ringing in the ears.  Some people get severe pain in the shoulder and have difficulty moving the arm where a shot was given. This happens very rarely.  Any medication can cause a severe allergic reaction. Such reactions from a vaccine are very rare, estimated at about 1 in a million doses, and would happen within a few minutes to a few hours after the vaccination. As with any medicine, there is a very remote chance of a vaccine causing a serious injury or death. The safety of vaccines is always being monitored. For more information, visit: www.cdc.gov/vaccinesafety/ 
 
5. What if there is a serious reaction? What should I look for?  Look for anything that concerns you, such as signs of a severe allergic reaction, very high fever, or unusual behavior. Signs of a severe allergic reaction can include hives, swelling of the face and throat, difficulty breathing, a fast heartbeat, dizziness, and weakness  usually within a few minutes to a few hours after the vaccination. What should I do?  If you think it is a severe allergic reaction or other emergency that cant wait, call 9-1-1 and get the person to the nearest hospital. Otherwise, call your doctor.  Reactions should be reported to the Vaccine Adverse Event Reporting System (VAERS).  Your doctor should file this report, or you can do it yourself through  the VAERS web site at www.vaers. [facility].gov, or by calling 9-726.467.4620. VAERS does not give medical advice. 6. The National Vaccine Injury Compensation Program 
 
The [facility] Vaccine Injury Compensation Program (VICP) is a federal program that was created to compensate people who may have been injured by certain vaccines. Persons who believe they may have been injured by a vaccine can learn about the program and about filing a claim by calling 2-901.739.8085 or visiting the [facility] [address] website at www.[facility].gov/vaccinecompensation. There is a time limit to file a claim for compensation. 7. How can I learn more?  Ask your healthcare provider. He or she can give you the vaccine package insert or suggest other sources of information.  Call your local or state health department.  Contact the Centers for Disease Control and Prevention (CDC): 
- Call 9-878.527.9285 (3-824-AZQ-INFO) or 
- Visit CDCs website at www.cdc.gov/flu Vaccine Information Statement Inactivated Influenza Vaccine 8/7/2015 
42  Ashley Youngblood [id number] [facility] and [employer] Centers for Disease Control and Prevention Office Use Only Introducing [facility]! [address] introduces [employer] patient portal. Now you can access parts of your medical record, email your doctor's office, and request medication refills online. 1. In your internet browser, go to [email or web address] 2. Click on the First Time User? Click Here link in the Sign In box. You will see the New Member Sign Up page. 3. Enter your [employer] Access Code exactly as it appears below. You will not need to use this code after youve completed the sign-up process. If you do not sign up before the expiration date, you must request a new code. · [employer] Access Code: [email or web address] Expires: 3/21/2018  4:15 PM 
 
4.  Enter the last four digits of your Social Security Number (xxxx) and Date of Birth (mm/dd/yyyy) as indicated and click Submit. You will be taken to the next sign-up page. 5. Create a [employer] ID. This will be your [employer] login ID and cannot be changed, so think of one that is secure and easy to remember. 6. Create a [employer] password. You can change your password at any time. 7. Enter your Password Reset Question and Answer. This can be used at a later time if you forget your password. 8. Enter your e-mail address. You will receive e-mail notification when new information is available in [address] 9. Click Sign Up. You can now view and download portions of your medical record. 10. Click the Download Summary menu link to download a portable copy of your medical information. If you have questions, please visit the Frequently Asked Questions section of the [employer] website. Remember, [employer] is NOT to be used for urgent needs. For medical emergencies, dial 911. Now available from your iPhone and Android! Please provide this summary of care documentation to your next provider. Your primary care clinician is listed as Ethel Ruiz. If you have any questions after today's visit, please call [phone].

## 2017-12-21 NOTE — PATIENT INSTRUCTIONS
Vaccine Information Statement    Influenza (Flu) Vaccine (Inactivated or Recombinant): What you need to know    Many Vaccine Information Statements are available in Croatian and other languages. See www.immunize.org/vis  Hojas de Información Sobre Vacunas están disponibles en Español y en muchos otros idiomas. Visite www.immunize.org/vis    1. Why get vaccinated? Influenza (flu) is a contagious disease that spreads around the [place] every year, usually between October and May. Flu is caused by influenza viruses, and is spread mainly by coughing, sneezing, and close contact. Anyone can get flu. Flu strikes suddenly and can last several days. Symptoms vary by age, but can include:   fever/chills   sore throat   muscle aches   fatigue   cough   headache    runny or stuffy nose    Flu can also lead to pneumonia and blood infections, and cause diarrhea and seizures in children. If you have a medical condition, such as heart or lung disease, flu can make it worse. Flu is more dangerous for some people. Infants and young children, people 72years of age and older, pregnant women, and people with certain health conditions or a weakened immune system are at greatest risk. Each year thousands of people in the [facility] die from flu, and many more are hospitalized. Flu vaccine can:   keep you from getting flu,   make flu less severe if you do get it, and   keep you from spreading flu to your family and other people. 2. Inactivated and recombinant flu vaccines    A dose of flu vaccine is recommended every flu season. Children 6 months through 6years of age may need two doses during the same flu season. Everyone else needs only one dose each flu season.        Some inactivated flu vaccines contain a very small amount of a mercury-based preservative called thimerosal. Studies have not shown thimerosal in vaccines to be harmful, but flu vaccines that do not contain thimerosal are available. There is no live flu virus in flu shots. They cannot cause the flu. There are many flu viruses, and they are always changing. Each year a new flu vaccine is made to protect against three or four viruses that are likely to cause disease in the upcoming flu season. But even when the vaccine doesnt exactly match these viruses, it may still provide some protection    Flu vaccine cannot prevent:   flu that is caused by a virus not covered by the vaccine, or   illnesses that look like flu but are not. It takes about 2 weeks for protection to develop after vaccination, and protection lasts through the flu season. 3. Some people should not get this vaccine    Tell the person who is giving you the vaccine:     If you have any severe, life-threatening allergies. If you ever had a life-threatening allergic reaction after a dose of flu vaccine, or have a severe allergy to any part of this vaccine, you may be advised not to get vaccinated. Most, but not all, types of flu vaccine contain a small amount of egg protein.  If you ever had Guillain-Barré Syndrome (also called GBS). Some people with a history of GBS should not get this vaccine. This should be discussed with your doctor.  If you are not feeling well. It is usually okay to get flu vaccine when you have a mild illness, but you might be asked to come back when you feel better. 4. Risks of a vaccine reaction    With any medicine, including vaccines, there is a chance of reactions. These are usually mild and go away on their own, but serious reactions are also possible. Most people who get a flu shot do not have any problems with it.      Minor problems following a flu shot include:    soreness, redness, or swelling where the shot was given     hoarseness   sore, red or itchy eyes   cough   fever   aches   headache   itching   fatigue  If these problems occur, they usually begin soon after the shot and last 1 or 2 days. More serious problems following a flu shot can include the following:     There may be a small increased risk of Guillain-Barré Syndrome (GBS) after inactivated flu vaccine. This risk has been estimated at 1 or 2 additional cases per million people vaccinated. This is much lower than the risk of severe complications from flu, which can be prevented by flu vaccine.  Young children who get the flu shot along with pneumococcal vaccine (PCV13) and/or DTaP vaccine at the same time might be slightly more likely to have a seizure caused by fever. Ask your doctor for more information. Tell your doctor if a child who is getting flu vaccine has ever had a seizure. Problems that could happen after any injected vaccine:      People sometimes faint after a medical procedure, including vaccination. Sitting or lying down for about 15 minutes can help prevent fainting, and injuries caused by a fall. Tell your doctor if you feel dizzy, or have vision changes or ringing in the ears.  Some people get severe pain in the shoulder and have difficulty moving the arm where a shot was given. This happens very rarely.  Any medication can cause a severe allergic reaction. Such reactions from a vaccine are very rare, estimated at about 1 in a million doses, and would happen within a few minutes to a few hours after the vaccination. As with any medicine, there is a very remote chance of a vaccine causing a serious injury or death. The safety of vaccines is always being monitored. For more information, visit: www.cdc.gov/vaccinesafety/    5. What if there is a serious reaction? What should I look for?  Look for anything that concerns you, such as signs of a severe allergic reaction, very high fever, or unusual behavior.     Signs of a severe allergic reaction can include hives, swelling of the face and throat, difficulty breathing, a fast heartbeat, dizziness, and weakness - usually within a few minutes to a few hours after the vaccination. What should I do?  If you think it is a severe allergic reaction or other emergency that cant wait, call 9-1-1 and get the person to the nearest hospital. Otherwise, call your doctor.  Reactions should be reported to the Vaccine Adverse Event Reporting System (VAERS). Your doctor should file this report, or you can do it yourself through  the VAERS web site at www.vaers. [facility].gov, or by calling 1-397.788.9924. VAERS does not give medical advice. 6. The National Vaccine Injury Compensation Program    The [facility] Vaccine Injury Compensation Program (VICP) is a federal program that was created to compensate people who may have been injured by certain vaccines. Persons who believe they may have been injured by a vaccine can learn about the program and about filing a claim by calling 4-133.553.1929 or visiting the [employer] website at www.[facility].gov/vaccinecompensation. There is a time limit to file a claim for compensation. 7. How can I learn more?  Ask your healthcare provider. He or she can give you the vaccine package insert or suggest other sources of information.  Call your local or state health department.  Contact the Centers for Disease Control and Prevention (CDC):  - Call 0-881.619.1613 (1-800-CDC-INFO) or  - Visit CDCs website at www.cdc.gov/flu    Vaccine Information Statement   Inactivated Influenza Vaccine   8/7/2015  43 ANNA Austin [id number]    Department of Health and Human Services  Centers for Disease Control and Prevention    Office Use Only

## 2017-12-21 NOTE — PROGRESS NOTES
HISTORY OF PRESENT ILLNESS  Antonio Linda is a 40 y.o. male. HPI Pt. Comes in for blood pressure check. Still has some back pains at times. Still working. ROS    Physical Exam   Constitutional: He appears well-developed and well-nourished. HENT:   Right Ear: External ear normal.   Left Ear: External ear normal.   Mouth/Throat: Oropharynx is clear and moist.   Neck: No thyromegaly present. Cardiovascular: Normal rate, regular rhythm, normal heart sounds and intact distal pulses. Pulmonary/Chest: Effort normal and breath sounds normal. No respiratory distress. He has no wheezes. Abdominal: Soft. Bowel sounds are normal. He exhibits no distension and no mass. There is no tenderness. There is no guarding. Musculoskeletal: Normal range of motion. He exhibits no edema. Lymphadenopathy:     He has no cervical adenopathy. Nursing note and vitals reviewed. ASSESSMENT and PLAN  Orders Placed This Encounter    Influenza virus vaccine (QUADRIVALENT PRES FREE SYRINGE) IM (51494)    CBC WITH AUTOMATED DIFF    METABOLIC PANEL, COMPREHENSIVE    LIPID PANEL    MN IMMUNIZ ADMIN,1 SINGLE/COMB VAC/TOXOID    HYDROcodone-acetaminophen (NORCO) 5-325 mg per tablet    carvedilol (COREG) 25 mg tablet    ALPRAZolam (XANAX) 1 mg tablet     Diagnoses and all orders for this visit:    1. Essential hypertension  -     CBC WITH AUTOMATED DIFF  -     METABOLIC PANEL, COMPREHENSIVE  -     LIPID PANEL    2. Routine general medical examination at a health care facility  -     HYDROcodone-acetaminophen (NORCO) 5-325 mg per tablet; Take 2 Tabs by mouth every six (6) hours as needed for Pain. Max Daily Amount: 8 Tabs. 3. Encounter for immunization  -     Influenza virus vaccine (QUADRIVALENT PRES FREE SYRINGE) IM (15191)  -     MN IMMUNIZ ADMIN,1 SINGLE/COMB VAC/TOXOID    4. Anxiety  -     ALPRAZolam (XANAX) 1 mg tablet; TAKE 1 TABLET BY MOUTH 3 TIMES A DAY AS NEEDED FOR ANXIETY.  NOT TO EXCEED REFILLS BEFORE 11-03-15    Other orders  -     carvedilol (COREG) 25 mg tablet; Take 1 Tab by mouth two (2) times daily (with meals). ATTN Pharmacist  Please Notify and educate patient of Change in Medication      Follow-up Disposition:  Return in about 6 months (around 6/21/2018).   Several skin tags frozen

## 2017-12-21 NOTE — PROGRESS NOTES
Chief Complaint   Patient presents with    Hypertension    Tailbone Pain    LOW BACK PAIN     1. Have you been to the ER, urgent care clinic since your last visit? Hospitalized since your last visit? No    2. Have you seen or consulted any other health care providers outside of the [address] [place] [address] since your last visit? Include any pap smears or colon screening.  No     Health Maintenance Due   Topic Date Due    Influenza Age 5 to Adult  08/01/2017

## 2017-12-22 LAB
ALBUMIN SERPL-MCNC: 4.5 G/DL (ref 3.5–5.5)
ALBUMIN/GLOB SERPL: 1.7 {RATIO} (ref 1.2–2.2)
ALP SERPL-CCNC: 76 IU/L (ref 39–117)
ALT SERPL-CCNC: 28 IU/L (ref 0–44)
AST SERPL-CCNC: 16 IU/L (ref 0–40)
BASOPHILS # BLD AUTO: 0.1 X10E3/UL (ref 0–0.2)
BASOPHILS NFR BLD AUTO: 1 %
BILIRUB SERPL-MCNC: 0.4 MG/DL (ref 0–1.2)
BUN SERPL-MCNC: 10 MG/DL (ref 6–24)
BUN/CREAT SERPL: 11 (ref 9–20)
CALCIUM SERPL-MCNC: 10.1 MG/DL (ref 8.7–10.2)
CHLORIDE SERPL-SCNC: 96 MMOL/L (ref 96–106)
CHOLEST SERPL-MCNC: 189 MG/DL (ref 100–199)
CO2 SERPL-SCNC: 23 MMOL/L (ref 18–29)
CREAT SERPL-MCNC: 0.95 MG/DL (ref 0.76–1.27)
EOSINOPHIL # BLD AUTO: 0.4 X10E3/UL (ref 0–0.4)
EOSINOPHIL NFR BLD AUTO: 4 %
ERYTHROCYTE [DISTWIDTH] IN BLOOD BY AUTOMATED COUNT: 13.7 % (ref 12.3–15.4)
GLOBULIN SER CALC-MCNC: 2.7 G/DL (ref 1.5–4.5)
GLUCOSE SERPL-MCNC: 98 MG/DL (ref 65–99)
HCT VFR BLD AUTO: 45.8 % (ref 37.5–51)
HDLC SERPL-MCNC: 43 MG/DL
HGB BLD-MCNC: 16.1 G/DL (ref 13–17.7)
IMM GRANULOCYTES # BLD: 0.1 X10E3/UL (ref 0–0.1)
IMM GRANULOCYTES NFR BLD: 1 %
INTERPRETATION, 910389: NORMAL
LDLC SERPL CALC-MCNC: ABNORMAL MG/DL (ref 0–99)
LYMPHOCYTES # BLD AUTO: 2.2 X10E3/UL (ref 0.7–3.1)
LYMPHOCYTES NFR BLD AUTO: 23 %
MCH RBC QN AUTO: 29.8 PG (ref 26.6–33)
MCHC RBC AUTO-ENTMCNC: 35.2 G/DL (ref 31.5–35.7)
MCV RBC AUTO: 85 FL (ref 79–97)
MONOCYTES # BLD AUTO: 1 X10E3/UL (ref 0.1–0.9)
MONOCYTES NFR BLD AUTO: 10 %
NEUTROPHILS # BLD AUTO: 5.8 X10E3/UL (ref 1.4–7)
NEUTROPHILS NFR BLD AUTO: 61 %
PDF IMAGE, 910387: NORMAL
PLATELET # BLD AUTO: 259 X10E3/UL (ref 150–379)
POTASSIUM SERPL-SCNC: 3.9 MMOL/L (ref 3.5–5.2)
PROT SERPL-MCNC: 7.2 G/DL (ref 6–8.5)
RBC # BLD AUTO: 5.4 X10E6/UL (ref 4.14–5.8)
SODIUM SERPL-SCNC: 139 MMOL/L (ref 134–144)
TRIGL SERPL-MCNC: 418 MG/DL (ref 0–149)
VLDLC SERPL CALC-MCNC: ABNORMAL MG/DL (ref 5–40)
WBC # BLD AUTO: 9.5 X10E3/UL (ref 3.4–10.8)

## 2018-06-01 RX ORDER — HYDROCHLOROTHIAZIDE 25 MG/1
25 TABLET ORAL DAILY
Qty: 90 TAB | Refills: 1 | Status: SHIPPED | OUTPATIENT
Start: 2018-06-01 | End: 2018-09-17 | Stop reason: SDUPTHER

## 2018-07-06 DIAGNOSIS — F41.9 ANXIETY: ICD-10-CM

## 2018-07-06 NOTE — TELEPHONE ENCOUNTER
Last Visit: 12/21/17-Placido  Next Appt: 7/26/18-Placido  Previous Refill Encounter: 12/21/17-50+5    Requested Prescriptions     Pending Prescriptions Disp Refills    ALPRAZolam (XANAX) 1 mg tablet 50 Tab 0     Sig: TAKE 1 TABLET BY MOUTH 3 TIMES A DAY AS NEEDED FOR ANXIETY.  NOT TO EXCEED REFILLS BEFORE 11-03-15

## 2018-07-09 RX ORDER — ALPRAZOLAM 1 MG/1
TABLET ORAL
Qty: 50 TAB | Refills: 0 | Status: SHIPPED | OUTPATIENT
Start: 2018-07-09 | End: 2018-08-21 | Stop reason: SDUPTHER

## 2018-07-11 ENCOUNTER — TELEPHONE (OUTPATIENT)
Dept: FAMILY MEDICINE CLINIC | Age: 43
End: 2018-07-11

## 2018-07-12 ENCOUNTER — DOCUMENTATION ONLY (OUTPATIENT)
Dept: FAMILY MEDICINE CLINIC | Age: 43
End: 2018-07-12

## 2018-08-21 ENCOUNTER — OFFICE VISIT (OUTPATIENT)
Dept: FAMILY MEDICINE CLINIC | Age: 43
End: 2018-08-21

## 2018-08-21 VITALS
WEIGHT: 315 LBS | BODY MASS INDEX: 44.1 KG/M2 | DIASTOLIC BLOOD PRESSURE: 86 MMHG | HEIGHT: 71 IN | OXYGEN SATURATION: 98 % | SYSTOLIC BLOOD PRESSURE: 144 MMHG | TEMPERATURE: 97.3 F | HEART RATE: 64 BPM | RESPIRATION RATE: 18 BRPM

## 2018-08-21 DIAGNOSIS — I10 ESSENTIAL HYPERTENSION: Primary | ICD-10-CM

## 2018-08-21 DIAGNOSIS — G89.29 CHRONIC BILATERAL LOW BACK PAIN WITHOUT SCIATICA: ICD-10-CM

## 2018-08-21 DIAGNOSIS — M54.50 CHRONIC BILATERAL LOW BACK PAIN WITHOUT SCIATICA: ICD-10-CM

## 2018-08-21 DIAGNOSIS — F41.9 ANXIETY: ICD-10-CM

## 2018-08-21 PROBLEM — E66.01 OBESITY, MORBID (HCC): Status: ACTIVE | Noted: 2018-08-21

## 2018-08-21 RX ORDER — KETOROLAC TROMETHAMINE 10 MG/1
TABLET, FILM COATED ORAL
COMMUNITY
Start: 2018-07-22 | End: 2018-08-21 | Stop reason: ALTCHOICE

## 2018-08-21 RX ORDER — ALPRAZOLAM 1 MG/1
TABLET ORAL
Qty: 50 TAB | Refills: 5 | Status: SHIPPED | OUTPATIENT
Start: 2018-08-21 | End: 2019-03-27 | Stop reason: SDUPTHER

## 2018-08-21 RX ORDER — CYCLOBENZAPRINE HCL 5 MG
TABLET ORAL
COMMUNITY
Start: 2018-07-22

## 2018-08-21 RX ORDER — TIZANIDINE 4 MG/1
TABLET ORAL
Qty: 40 TAB | Refills: 2 | Status: SHIPPED | OUTPATIENT
Start: 2018-08-21 | End: 2019-04-11 | Stop reason: ALTCHOICE

## 2018-08-21 NOTE — MR AVS SNAPSHOT
Sivan Garciabird 
 
 
 [address] [facility] [id number] [phone] Patient: Cindi Mitchell MRN: [id number] MXT:[birth year] Visit Information Date & Time Provider Department Dept. Phone Encounter #  
 8/21/2018  9:15 AM Rubina Martin MD [facility] [phone] [id number] Follow-up Instructions Return in about 6 months (around 2/21/2019). Upcoming Health Maintenance Date Due Influenza Age 5 to Adult 8/1/2018 DTaP/Tdap/Td series (2 - Td) 9/12/2024 Allergies as of 8/21/2018  Review Complete On: 8/21/2018 By: Yordy Gallardo Severity Noted Reaction Type Reactions Lisinopril  05/29/2013   Side Effect Other (comments)  
 dizziness Current Immunizations  Reviewed on 12/1/2014 Name Date Influenza Vaccine 10/21/2016 Influenza Vaccine (Quad) PF 12/21/2017, 12/11/2015, 12/1/2014 Influenza Vaccine Split 9/21/2012 TD Vaccine 7/25/2011 11:09 PM  
  
 Not reviewed this visit You Were Diagnosed With   
  
 Codes Comments Essential hypertension    -  Primary ICD-10-CM: I10 
ICD-9-CM: 401.9 Anxiety     ICD-10-CM: F41.9 ICD-9-CM: 300.00 Chronic bilateral low back pain without sciatica     ICD-10-CM: M54.5, G89.29 ICD-9-CM: 724.2, 338.29 Vitals BP Pulse Temp Resp Height(growth percentile) Weight(growth percentile) 144/86 (BP 1 Location: Right arm, BP Patient Position: Sitting) 64 97.3 °F (36.3 °C) (Oral) 18 5' 11\" (1.803 m) 325 lb (147.4 kg) SpO2 BMI Smoking Status 98% 45.33 kg/m2 Former Smoker Vitals History BMI and BSA Data Body Mass Index Body Surface Area  
 45.33 kg/m 2 2.72 m 2 Preferred Pharmacy Pharmacy Name Phone [id number] [address] [phone] Your Updated Medication List  
  
   
 This list is accurate as of 8/21/18 10:14 AM.  Always use your most recent med list.  
  
  
  
  
 ALPRAZolam 1 mg tablet Commonly known as:  XANAX  
TAKE 1 TABLET BY MOUTH 3 TIMES A DAY AS NEEDED FOR ANXIETY. NOT TO EXCEED REFILLS BEFORE 11-03-15  
  
 carvedilol 25 mg tablet Commonly known as:  Pascual Rivera Take 1 Tab by mouth two (2) times daily (with meals). ATTN Pharmacist  Please Notify and educate patient of Change in Medication  
  
 cyclobenzaprine 5 mg tablet Commonly known as:  FLEXERIL  
  
 hydroCHLOROthiazide 25 mg tablet Commonly known as:  HYDRODIURIL Take 1 Tab by mouth daily. For blood pressure  
  
 tiZANidine 4 mg tablet Commonly known as:  Rosey Reagin One every 8 hours as needed for muscle relaxer Prescriptions Printed Refills ALPRAZolam (XANAX) 1 mg tablet 5 Sig: TAKE 1 TABLET BY MOUTH 3 TIMES A DAY AS NEEDED FOR ANXIETY. NOT TO EXCEED REFILLS BEFORE 11-03-15 Class: Print Prescriptions Sent to Pharmacy Refills  
 tiZANidine (ZANAFLEX) 4 mg tablet 2 Sig: One every 8 hours as needed for muscle relaxer Class: Normal  
 Pharmacy: [facility]/pharmacy [address] [place] [address] [address] Ph #: [phone] Follow-up Instructions Return in about 6 months (around 2/21/2019). Introducing [facility]! [employer] introduces Shipu patient portal. Now you can access parts of your medical record, email your doctor's office, and request medication refills online. 1. In your internet browser, go to [email or web address] 2. Click on the First Time User? Click Here link in the Sign In box. You will see the New Member Sign Up page. 3. Enter your Shipu Access Code exactly as it appears below. You will not need to use this code after youve completed the sign-up process. If you do not sign up before the expiration date, you must request a new code. · Rewarding Return Access Code: [email or web address] Expires: 11/19/2018 10:14 AM 
 
4. Enter the last four digits of your Social Security Number (xxxx) and Date of Birth (mm/dd/yyyy) as indicated and click Submit. You will be taken to the next sign-up page. 5. Create a Rewarding Return ID. This will be your Rewarding Return login ID and cannot be changed, so think of one that is secure and easy to remember. 6. Create a Rewarding Return password. You can change your password at any time. 7. Enter your Password Reset Question and Answer. This can be used at a later time if you forget your password. 8. Enter your e-mail address. You will receive e-mail notification when new information is available in [address] 9. Click Sign Up. You can now view and download portions of your medical record. 10. Click the Download Summary menu link to download a portable copy of your medical information. If you have questions, please visit the Frequently Asked Questions section of the Rewarding Return website. Remember, Rewarding Return is NOT to be used for urgent needs. For medical emergencies, dial 911. Now available from your iPhone and Android! Please provide this summary of care documentation to your next provider. Your primary care clinician is listed as Shameka Montano. If you have any questions after today's visit, please call [phone].

## 2018-08-21 NOTE — PROGRESS NOTES
Chief Complaint   Patient presents with    Hypertension     follow up        1. Have you been to the ER, urgent care clinic since your last visit? Hospitalized since your last visit? Yes, [facility] End of July with back pain. 2. Have you seen or consulted any other health care providers outside of the [address] [place] [address] since your last visit? Include any pap smears or colon screening.  No    Health Maintenance Due   Topic Date Due    Influenza Age 5 to Adult  08/01/2018

## 2018-08-21 NOTE — PROGRESS NOTES
HISTORY OF PRESENT ILLNESS  Sofía Carlos is a 44 y.o. male. HPI Pt. Comes in for blood pressure check. Back flared up end of July, still somewhat uncomfortable. Wound up going to Urgent Care, dxed with a muscle sprain, and put on ibuprofen. Flexeril didn't help either. ROS    Physical Exam   Constitutional: He appears well-developed and well-nourished. HENT:   Right Ear: External ear normal.   Left Ear: External ear normal.   Mouth/Throat: Oropharynx is clear and moist.   Neck: No thyromegaly present. Cardiovascular: Normal rate, regular rhythm, normal heart sounds and intact distal pulses. Pulmonary/Chest: Effort normal and breath sounds normal. No respiratory distress. He has no wheezes. Abdominal: Soft. Bowel sounds are normal. He exhibits no distension and no mass. There is no tenderness. There is no guarding. Musculoskeletal: Normal range of motion. He exhibits no edema. Back- tenderness paravertebral lumbar muscles   Lymphadenopathy:     He has no cervical adenopathy. Nursing note and vitals reviewed. ASSESSMENT and PLAN  Orders Placed This Encounter    METABOLIC PANEL, BASIC    ALPRAZolam (XANAX) 1 mg tablet     Sig: TAKE 1 TABLET BY MOUTH 3 TIMES A DAY AS NEEDED FOR ANXIETY. NOT TO EXCEED REFILLS BEFORE 11-03-15     Dispense:  50 Tab     Refill:  5     This request is for a new prescription for a controlled substance as required by Federal/State law. Gloria Gonzalez tiZANidine (ZANAFLEX) 4 mg tablet     Sig: One every 8 hours as needed for muscle relaxer     Dispense:  40 Tab     Refill:  2     Orders Placed This Encounter    METABOLIC PANEL, BASIC    ALPRAZolam (XANAX) 1 mg tablet    tiZANidine (ZANAFLEX) 4 mg tablet     Diagnoses and all orders for this visit:    1. Essential hypertension  -     METABOLIC PANEL, BASIC    2. Anxiety  -     ALPRAZolam (XANAX) 1 mg tablet; TAKE 1 TABLET BY MOUTH 3 TIMES A DAY AS NEEDED FOR ANXIETY. NOT TO EXCEED REFILLS BEFORE 11-03-15    3.  Chronic bilateral low back pain without sciatica    Other orders  -     tiZANidine (ZANAFLEX) 4 mg tablet;  One every 8 hours as needed for muscle relaxer

## 2018-08-22 LAB
BUN SERPL-MCNC: 11 MG/DL (ref 6–24)
BUN/CREAT SERPL: 12 (ref 9–20)
CALCIUM SERPL-MCNC: 9.8 MG/DL (ref 8.7–10.2)
CHLORIDE SERPL-SCNC: 99 MMOL/L (ref 96–106)
CO2 SERPL-SCNC: 23 MMOL/L (ref 20–29)
CREAT SERPL-MCNC: 0.93 MG/DL (ref 0.76–1.27)
GLUCOSE SERPL-MCNC: 97 MG/DL (ref 65–99)
POTASSIUM SERPL-SCNC: 4.2 MMOL/L (ref 3.5–5.2)
SODIUM SERPL-SCNC: 137 MMOL/L (ref 134–144)

## 2018-09-17 RX ORDER — HYDROCHLOROTHIAZIDE 25 MG/1
25 TABLET ORAL DAILY
Qty: 90 TAB | Refills: 3 | Status: SHIPPED | OUTPATIENT
Start: 2018-09-17 | End: 2019-10-11 | Stop reason: SDUPTHER

## 2018-09-17 NOTE — TELEPHONE ENCOUNTER
Last Visit: 8/21-Placido  Next Appt: 2/22-Placido  Previous Refill Encounter: 90+3-4/4/17    Requested Prescriptions     Pending Prescriptions Disp Refills    hydroCHLOROthiazide (HYDRODIURIL) 25 mg tablet 90 Tab 3     Sig: Take 1 Tab by mouth daily.  For blood pressure

## 2019-01-31 RX ORDER — CARVEDILOL 25 MG/1
TABLET ORAL
Qty: 180 TAB | Refills: 2 | Status: SHIPPED | OUTPATIENT
Start: 2019-01-31 | End: 2019-12-29

## 2019-03-27 DIAGNOSIS — F41.9 ANXIETY: ICD-10-CM

## 2019-03-27 RX ORDER — ALPRAZOLAM 1 MG/1
TABLET ORAL
Qty: 50 TAB | Refills: 5 | Status: SHIPPED | OUTPATIENT
Start: 2019-03-27 | End: 2019-10-11 | Stop reason: SDUPTHER

## 2019-03-27 NOTE — TELEPHONE ENCOUNTER
Last Visit: 8/21  Next Appt: 4/11  Previous Refill Encounter: 8/21-50+5    Requested Prescriptions     Pending Prescriptions Disp Refills    ALPRAZolam (XANAX) 1 mg tablet 50 Tab 5     Sig: TAKE 1 TABLET BY MOUTH 3 TIMES A DAY AS NEEDED FOR ANXIETY.  NOT TO EXCEED REFILLS BEFORE 11-03-15

## 2019-03-28 ENCOUNTER — DOCUMENTATION ONLY (OUTPATIENT)
Dept: FAMILY MEDICINE CLINIC | Age: 44
End: 2019-03-28

## 2019-04-11 ENCOUNTER — OFFICE VISIT (OUTPATIENT)
Dept: FAMILY MEDICINE CLINIC | Age: 44
End: 2019-04-11

## 2019-04-11 VITALS
HEIGHT: 71 IN | TEMPERATURE: 98.2 F | HEART RATE: 92 BPM | WEIGHT: 315 LBS | OXYGEN SATURATION: 94 % | SYSTOLIC BLOOD PRESSURE: 134 MMHG | DIASTOLIC BLOOD PRESSURE: 83 MMHG | RESPIRATION RATE: 14 BRPM | BODY MASS INDEX: 44.1 KG/M2

## 2019-04-11 DIAGNOSIS — I10 ESSENTIAL HYPERTENSION: ICD-10-CM

## 2019-04-11 DIAGNOSIS — G89.29 CHRONIC BILATERAL LOW BACK PAIN WITHOUT SCIATICA: Primary | ICD-10-CM

## 2019-04-11 DIAGNOSIS — M54.50 CHRONIC BILATERAL LOW BACK PAIN WITHOUT SCIATICA: Primary | ICD-10-CM

## 2019-04-11 RX ORDER — HYDROCODONE BITARTRATE AND ACETAMINOPHEN 5; 325 MG/1; MG/1
2 TABLET ORAL
Qty: 60 TAB | Refills: 0 | Status: SHIPPED | OUTPATIENT
Start: 2019-04-11 | End: 2019-10-11 | Stop reason: SDUPTHER

## 2019-04-11 RX ORDER — SULFAMETHOXAZOLE AND TRIMETHOPRIM 800; 160 MG/1; MG/1
TABLET ORAL
Refills: 0 | COMMUNITY
Start: 2019-03-25 | End: 2019-10-11 | Stop reason: ALTCHOICE

## 2019-04-11 NOTE — PROGRESS NOTES
Chief Complaint Patient presents with  Sinus Pain  Sore Throat  
  feels like tonsils flarred up  Nasal Congestion  Back Pain  ED Follow-up [facility] sore on right leg, SHIN area March 2019 1. Have you been to the ER, urgent care clinic since your last visit? Hospitalized since your last visit? [facility] sore on right leg, SHIN area March 2019 
 
2. Have you seen or consulted any other health care providers outside of the [address] [place] [address] since your last visit? Include any pap smears or colon screening. No  
 
 
There are no preventive care reminders to display for this patient.

## 2019-04-11 NOTE — PROGRESS NOTES
HISTORY OF PRESENT ILLNESS Marvin Sanchez is a 38 y.o. male. HPI Pt. Comes in for blood pressure check. No complaints of chest pain, shortness of breath, TIAs, claudication or edema. Some back pain at times. Tizanidine- caused severe drowsiness. Getting over a staph infection in R leg. Has also been having itching in feet. Also thinks needs CPAP machine. Used to have one 10 years ago. stops breathing at night. ROS Physical Exam  
Constitutional: He appears well-developed and well-nourished. HENT:  
Right Ear: External ear normal.  
Left Ear: External ear normal.  
Mouth/Throat: Oropharynx is clear and moist.  
Neck: No thyromegaly present. Cardiovascular: Normal rate, regular rhythm, normal heart sounds and intact distal pulses. Pulmonary/Chest: Effort normal and breath sounds normal. No respiratory distress. He has no wheezes. Abdominal: Soft. Bowel sounds are normal. He exhibits no distension and no mass. There is no tenderness. There is no guarding. Musculoskeletal: Normal range of motion. He exhibits no edema. Lymphadenopathy:  
  He has no cervical adenopathy. Nursing note and vitals reviewed. ASSESSMENT and PLAN Orders Placed This Encounter  CBC WITH AUTOMATED DIFF  
 LIPID PANEL  
 METABOLIC PANEL, COMPREHENSIVE  
 HYDROcodone-acetaminophen (NORCO) 5-325 mg per tablet Diagnoses and all orders for this visit: 
 
1. Chronic bilateral low back pain without sciatica 
-     HYDROcodone-acetaminophen (NORCO) 5-325 mg per tablet; Take 2 Tabs by mouth every twelve (12) hours as needed for Pain for up to 30 days. Max Daily Amount: 4 Tabs. 2. Essential hypertension 
-     CBC WITH AUTOMATED DIFF 
-     LIPID PANEL 
-     METABOLIC PANEL, COMPREHENSIVE 3. EUGENIA Follow-up and Dispositions · Return in about 6 months (around 10/11/2019).  
  
 
Will order home sleep test.

## 2019-04-12 LAB
ALBUMIN SERPL-MCNC: 4.4 G/DL (ref 3.5–5.5)
ALBUMIN/GLOB SERPL: 1.6 {RATIO} (ref 1.2–2.2)
ALP SERPL-CCNC: 68 IU/L (ref 39–117)
ALT SERPL-CCNC: 30 IU/L (ref 0–44)
AST SERPL-CCNC: 25 IU/L (ref 0–40)
BASOPHILS # BLD AUTO: 0.1 X10E3/UL (ref 0–0.2)
BASOPHILS NFR BLD AUTO: 1 %
BILIRUB SERPL-MCNC: 0.6 MG/DL (ref 0–1.2)
BUN SERPL-MCNC: 11 MG/DL (ref 6–24)
BUN/CREAT SERPL: 11 (ref 9–20)
CALCIUM SERPL-MCNC: 9.7 MG/DL (ref 8.7–10.2)
CHLORIDE SERPL-SCNC: 104 MMOL/L (ref 96–106)
CHOLEST SERPL-MCNC: 193 MG/DL (ref 100–199)
CO2 SERPL-SCNC: 25 MMOL/L (ref 20–29)
CREAT SERPL-MCNC: 1.01 MG/DL (ref 0.76–1.27)
EOSINOPHIL # BLD AUTO: 0.2 X10E3/UL (ref 0–0.4)
EOSINOPHIL NFR BLD AUTO: 2 %
ERYTHROCYTE [DISTWIDTH] IN BLOOD BY AUTOMATED COUNT: 14.3 % (ref 12.3–15.4)
GLOBULIN SER CALC-MCNC: 2.7 G/DL (ref 1.5–4.5)
GLUCOSE SERPL-MCNC: 83 MG/DL (ref 65–99)
HCT VFR BLD AUTO: 44.8 % (ref 37.5–51)
HDLC SERPL-MCNC: 45 MG/DL
HGB BLD-MCNC: 14.9 G/DL (ref 13–17.7)
IMM GRANULOCYTES # BLD AUTO: 0 X10E3/UL (ref 0–0.1)
IMM GRANULOCYTES NFR BLD AUTO: 0 %
INTERPRETATION, 910389: NORMAL
LDLC SERPL CALC-MCNC: 99 MG/DL (ref 0–99)
LYMPHOCYTES # BLD AUTO: 2.5 X10E3/UL (ref 0.7–3.1)
LYMPHOCYTES NFR BLD AUTO: 24 %
MCH RBC QN AUTO: 28.9 PG (ref 26.6–33)
MCHC RBC AUTO-ENTMCNC: 33.3 G/DL (ref 31.5–35.7)
MCV RBC AUTO: 87 FL (ref 79–97)
MONOCYTES # BLD AUTO: 0.9 X10E3/UL (ref 0.1–0.9)
MONOCYTES NFR BLD AUTO: 8 %
NEUTROPHILS # BLD AUTO: 6.6 X10E3/UL (ref 1.4–7)
NEUTROPHILS NFR BLD AUTO: 65 %
PLATELET # BLD AUTO: 257 X10E3/UL (ref 150–379)
POTASSIUM SERPL-SCNC: 4.6 MMOL/L (ref 3.5–5.2)
PROT SERPL-MCNC: 7.1 G/DL (ref 6–8.5)
RBC # BLD AUTO: 5.16 X10E6/UL (ref 4.14–5.8)
SODIUM SERPL-SCNC: 145 MMOL/L (ref 134–144)
TRIGL SERPL-MCNC: 247 MG/DL (ref 0–149)
VLDLC SERPL CALC-MCNC: 49 MG/DL (ref 5–40)
WBC # BLD AUTO: 10.2 X10E3/UL (ref 3.4–10.8)

## 2019-04-17 NOTE — PROGRESS NOTES
See cardiology for results      Pt received a 24 hour holter monitor. Instructions given. Pt verbalized understanding. [FreeTextEntry1] : PFTs 4/17/19 - Mild restriction without significant change from her previous; lung volumes were essentially normal with a mildly reduced diffusion capacity; essentially unchanged with perhaps mild deterioration over the past 3-4 years.

## 2019-07-15 ENCOUNTER — TELEPHONE (OUTPATIENT)
Dept: FAMILY MEDICINE CLINIC | Age: 44
End: 2019-07-15

## 2019-07-15 DIAGNOSIS — G47.33 OBSTRUCTIVE SLEEP APNEA SYNDROME: Primary | ICD-10-CM

## 2019-07-15 NOTE — TELEPHONE ENCOUNTER
Called pt to find out where to send repair order to for cpap but got no answer.  Awaiting to hear from pt

## 2019-10-11 ENCOUNTER — OFFICE VISIT (OUTPATIENT)
Dept: FAMILY MEDICINE CLINIC | Age: 44
End: 2019-10-11

## 2019-10-11 VITALS
HEART RATE: 68 BPM | SYSTOLIC BLOOD PRESSURE: 134 MMHG | TEMPERATURE: 97 F | WEIGHT: 315 LBS | BODY MASS INDEX: 44.1 KG/M2 | OXYGEN SATURATION: 97 % | RESPIRATION RATE: 18 BRPM | DIASTOLIC BLOOD PRESSURE: 87 MMHG | HEIGHT: 71 IN

## 2019-10-11 DIAGNOSIS — G89.29 CHRONIC BILATERAL LOW BACK PAIN WITHOUT SCIATICA: ICD-10-CM

## 2019-10-11 DIAGNOSIS — I10 ESSENTIAL HYPERTENSION: ICD-10-CM

## 2019-10-11 DIAGNOSIS — M54.50 CHRONIC BILATERAL LOW BACK PAIN WITHOUT SCIATICA: ICD-10-CM

## 2019-10-11 DIAGNOSIS — F41.9 ANXIETY: ICD-10-CM

## 2019-10-11 DIAGNOSIS — Z23 ENCOUNTER FOR IMMUNIZATION: Primary | ICD-10-CM

## 2019-10-11 RX ORDER — HYDROCODONE BITARTRATE AND ACETAMINOPHEN 5; 325 MG/1; MG/1
2 TABLET ORAL
Qty: 60 TAB | Refills: 0 | Status: SHIPPED | OUTPATIENT
Start: 2019-10-11 | End: 2019-11-10

## 2019-10-11 RX ORDER — ALPRAZOLAM 1 MG/1
TABLET ORAL
Qty: 50 TAB | Refills: 5 | Status: SHIPPED | OUTPATIENT
Start: 2019-10-11 | End: 2020-03-31 | Stop reason: SDUPTHER

## 2019-10-11 RX ORDER — HYDROCHLOROTHIAZIDE 25 MG/1
25 TABLET ORAL DAILY
Qty: 90 TAB | Refills: 3 | Status: SHIPPED | OUTPATIENT
Start: 2019-10-11 | End: 2020-05-05

## 2019-10-11 NOTE — PROGRESS NOTES
HISTORY OF PRESENT ILLNESS  Junior Prakash is a 38 y.o. male. HPI Pt. Comes in for blood pressure check. No complaints of chest pain, shortness of breath, TIAs, claudication or edema. ROS    Physical Exam   Constitutional: He appears well-developed and well-nourished. HENT:   Right Ear: External ear normal.   Left Ear: External ear normal.   Mouth/Throat: Oropharynx is clear and moist.   Neck: No thyromegaly present. Cardiovascular: Normal rate, regular rhythm, normal heart sounds and intact distal pulses. Pulmonary/Chest: Effort normal and breath sounds normal. No respiratory distress. He has no wheezes. Abdominal: Soft. Bowel sounds are normal. He exhibits no distension and no mass. There is no tenderness. There is no guarding. Musculoskeletal: Normal range of motion. He exhibits no edema. Lymphadenopathy:     He has no cervical adenopathy. Nursing note and vitals reviewed. ASSESSMENT and PLAN  Orders Placed This Encounter    Influenza virus vaccine (QUADRIVALENT PRES FREE SYRINGE) IM (28653)    METABOLIC PANEL, BASIC    NH IMMUNIZ ADMIN,1 SINGLE/COMB VAC/TOXOID    ALPRAZolam (XANAX) 1 mg tablet    hydroCHLOROthiazide (HYDRODIURIL) 25 mg tablet    HYDROcodone-acetaminophen (NORCO) 5-325 mg per tablet     Diagnoses and all orders for this visit:    1. Encounter for immunization  -     INFLUENZA VIRUS VAC QUAD,SPLIT,PRESV FREE SYRINGE IM  -     NH IMMUNIZ ADMIN,1 SINGLE/COMB VAC/TOXOID    2. Anxiety  -     ALPRAZolam (XANAX) 1 mg tablet; TAKE 1 TABLET BY MOUTH 3 TIMES A DAY AS NEEDED FOR ANXIETY. NOT TO EXCEED REFILLS BEFORE 11-03-15    3. Chronic bilateral low back pain without sciatica  -     HYDROcodone-acetaminophen (NORCO) 5-325 mg per tablet; Take 2 Tabs by mouth every twelve (12) hours as needed for Pain for up to 30 days. Max Daily Amount: 4 Tabs.     4. Essential hypertension  -     METABOLIC PANEL, BASIC    Other orders  -     hydroCHLOROthiazide (HYDRODIURIL) 25 mg tablet; Take 1 Tab by mouth daily. For blood pressure      Follow-up and Dispositions    · Return in about 6 months (around 4/11/2020).

## 2019-10-11 NOTE — PROGRESS NOTES
Chief Complaint   Patient presents with    Hypertension     Pt here for 6 month follow up     1. Have you been to the ER, urgent care clinic since your last visit? Hospitalized since your last visit? No     2. Have you seen or consulted any other health care providers outside of the [address] [place] [address] since your last visit? Include any pap smears or colon screening. No        pt having more anxiety lately. . Pt reports that he has been getting this feeling for no reason, just comes out of nowhere. Came be having a god day then gets anxious . Takes xanax only when really needs it .

## 2019-10-12 LAB
BUN SERPL-MCNC: 9 MG/DL (ref 6–24)
BUN/CREAT SERPL: 11 (ref 9–20)
CALCIUM SERPL-MCNC: 9.7 MG/DL (ref 8.7–10.2)
CHLORIDE SERPL-SCNC: 100 MMOL/L (ref 96–106)
CO2 SERPL-SCNC: 24 MMOL/L (ref 20–29)
CREAT SERPL-MCNC: 0.81 MG/DL (ref 0.76–1.27)
GLUCOSE SERPL-MCNC: 94 MG/DL (ref 65–99)
POTASSIUM SERPL-SCNC: 4.6 MMOL/L (ref 3.5–5.2)
SODIUM SERPL-SCNC: 139 MMOL/L (ref 134–144)

## 2019-12-29 RX ORDER — CARVEDILOL 25 MG/1
TABLET ORAL
Qty: 180 TAB | Refills: 2 | Status: SHIPPED | OUTPATIENT
Start: 2019-12-29 | End: 2020-07-14

## 2020-03-31 DIAGNOSIS — F41.9 ANXIETY: ICD-10-CM

## 2020-03-31 RX ORDER — ALPRAZOLAM 1 MG/1
TABLET ORAL
Qty: 50 TAB | Refills: 0 | Status: SHIPPED | OUTPATIENT
Start: 2020-04-03 | End: 2020-05-05

## 2020-03-31 NOTE — TELEPHONE ENCOUNTER
Spoke with patient identified patient with 2 identifiers. informed patient to keep appt. Unless office calls to reschedule. Medication refill will be sent to NP Eliot.

## 2020-03-31 NOTE — TELEPHONE ENCOUNTER
----- Message from Matias Alejandro sent at 3/31/2020 11:40 AM EDT ----- Regarding: Dr. Auguste Cluster Contact: [phone] Message: Pt is requesting a Rx refill on \"Xanax 1 mg\" [facility]. Pt advised that he is completely out of medication  Pt is also requesting a call back regarding if he needs to reschedule appt for 4/6/20. Best Contact: [phone]

## 2020-03-31 NOTE — TELEPHONE ENCOUNTER
Reviewed report generated by the [facility]. Does not demonstrate aberrancies or inconsistencies with regard to the prescribing of controlled medications to this patient by other providers. Last fill of xanax on 3/4/2020 for #50 tabs Refilled xanax to start on 4/3/2020 Orders Placed This Encounter  ALPRAZolam (XANAX) 1 mg tablet Sig: TAKE 1 TABLET BY MOUTH 3 TIMES A DAY AS NEEDED FOR ANXIETY Dispense:  50 Tab   Refill:  0

## 2020-04-02 ENCOUNTER — TELEPHONE (OUTPATIENT)
Dept: FAMILY MEDICINE CLINIC | Age: 45
End: 2020-04-02

## 2020-05-04 DIAGNOSIS — F41.9 ANXIETY: ICD-10-CM

## 2020-05-05 RX ORDER — ALPRAZOLAM 1 MG/1
TABLET ORAL
Qty: 50 TAB | Refills: 2 | Status: SHIPPED | OUTPATIENT
Start: 2020-05-05 | End: 2020-07-27

## 2020-05-05 RX ORDER — HYDROCHLOROTHIAZIDE 25 MG/1
TABLET ORAL
Qty: 90 TAB | Refills: 3 | Status: SHIPPED | OUTPATIENT
Start: 2020-05-05 | End: 2021-05-17 | Stop reason: SDUPTHER

## 2020-05-05 NOTE — TELEPHONE ENCOUNTER
Last OV:10/11/2019  Next Appt:6/17/2020  Last Refill: 4/3/2020 (50+R0)    Requested Prescriptions     Pending Prescriptions Disp Refills    ALPRAZolam (XANAX) 1 mg tablet [Pharmacy Med Name: ALPRAZOLAM 1 MG TABLET] 50 Tab 0     Sig: TAKE 1 TABLET BY MOUTH THREE TIMES A DAY AS NEEDED FOR ANXIETY

## 2020-07-14 RX ORDER — CARVEDILOL 25 MG/1
TABLET ORAL
Qty: 180 TAB | Refills: 1 | Status: SHIPPED | OUTPATIENT
Start: 2020-07-14 | End: 2021-05-17 | Stop reason: SDUPTHER

## 2021-01-11 DIAGNOSIS — F41.9 ANXIETY: ICD-10-CM

## 2021-01-12 RX ORDER — ALPRAZOLAM 1 MG/1
TABLET ORAL
Qty: 50 TAB | Refills: 2 | Status: SHIPPED | OUTPATIENT
Start: 2021-01-12 | End: 2021-05-17 | Stop reason: SDUPTHER

## 2021-04-26 ENCOUNTER — TELEPHONE (OUTPATIENT)
Dept: FAMILY MEDICINE CLINIC | Age: 46
End: 2021-04-26

## 2021-04-26 NOTE — TELEPHONE ENCOUNTER
----- Message from Yaquelin Muller sent at 4/26/2021 10:34 AM EDT -----  Regarding: Dr. Karon Peters (if not patient): Juhi Sandra      Relationship of caller (if not patient):  Wife      Best contact number(s): [phone]      Name of medication and dosage if known: Alprazolam 1 mg      Is patient out of this medication (yes/no): yes      Pharm[facility] name: [facility] [place] [address] listed in chart? (yes/no): NO  Pharmacy phone number: [phone]      Details to clarify the request:      Yaquelin Muller

## 2021-05-17 ENCOUNTER — OFFICE VISIT (OUTPATIENT)
Dept: FAMILY MEDICINE CLINIC | Age: 46
End: 2021-05-17
Payer: COMMERCIAL

## 2021-05-17 VITALS
OXYGEN SATURATION: 100 % | WEIGHT: 315 LBS | HEIGHT: 71 IN | SYSTOLIC BLOOD PRESSURE: 144 MMHG | TEMPERATURE: 97.7 F | DIASTOLIC BLOOD PRESSURE: 88 MMHG | BODY MASS INDEX: 44.1 KG/M2 | HEART RATE: 72 BPM | RESPIRATION RATE: 16 BRPM

## 2021-05-17 DIAGNOSIS — F41.9 ANXIETY: ICD-10-CM

## 2021-05-17 DIAGNOSIS — I10 ESSENTIAL HYPERTENSION: Primary | ICD-10-CM

## 2021-05-17 DIAGNOSIS — R53.82 CHRONIC FATIGUE: ICD-10-CM

## 2021-05-17 DIAGNOSIS — E78.00 HYPERCHOLESTEROLEMIA: ICD-10-CM

## 2021-05-17 DIAGNOSIS — J30.89 NON-SEASONAL ALLERGIC RHINITIS, UNSPECIFIED TRIGGER: ICD-10-CM

## 2021-05-17 PROCEDURE — 99396 PREV VISIT EST AGE 40-64: CPT | Performed by: FAMILY MEDICINE

## 2021-05-17 RX ORDER — VENLAFAXINE HYDROCHLORIDE 75 MG/1
75 CAPSULE, EXTENDED RELEASE ORAL DAILY
Qty: 90 CAP | Refills: 5 | Status: SHIPPED | OUTPATIENT
Start: 2021-05-17 | End: 2022-05-23

## 2021-05-17 RX ORDER — CARVEDILOL 25 MG/1
TABLET ORAL
Qty: 180 TAB | Refills: 1 | Status: SHIPPED | OUTPATIENT
Start: 2021-05-17 | End: 2022-02-01

## 2021-05-17 RX ORDER — HYDROCHLOROTHIAZIDE 25 MG/1
TABLET ORAL
Qty: 90 TAB | Refills: 3 | Status: SHIPPED | OUTPATIENT
Start: 2021-05-17 | End: 2021-05-18

## 2021-05-17 RX ORDER — ALPRAZOLAM 1 MG/1
TABLET ORAL
Qty: 50 TAB | Refills: 2 | Status: SHIPPED | OUTPATIENT
Start: 2021-05-17 | End: 2021-09-08

## 2021-05-17 NOTE — PROGRESS NOTES
HISTORY OF PRESENT ILLNESS  Chavez Sahu is a 40 y.o. male. HPI in for physical. Nerves have been bad, stressed out, crying spells, irritable, angry. Getting along ok with wife, children 11, 13, 23. Wants testosterone checked. Work going ok. Needs bp meds refilled. Review of Systems   Constitutional: Positive for malaise/fatigue. Negative for weight loss. HENT: Negative for hearing loss and tinnitus. Eyes: Negative for blurred vision and double vision. Respiratory: Negative for cough and wheezing. Nonsmoker. Uses sleep apnea machine. Cardiovascular: Negative for chest pain and leg swelling. Gastrointestinal: Negative for abdominal pain and diarrhea. Genitourinary: Negative for frequency and hematuria. Some weak stream.    Neurological: Negative for loss of consciousness and weakness. Endo/Heme/Allergies: Positive for polydipsia. Does not bruise/bleed easily. Psychiatric/Behavioral: Positive for depression. The patient does not have insomnia. Physical Exam  Vitals signs and nursing note reviewed. Constitutional:       Appearance: He is well-developed. HENT:      Right Ear: External ear normal.      Left Ear: External ear normal.   Neck:      Thyroid: No thyromegaly. Cardiovascular:      Rate and Rhythm: Normal rate and regular rhythm. Heart sounds: Normal heart sounds. Pulmonary:      Effort: Pulmonary effort is normal. No respiratory distress. Breath sounds: Normal breath sounds. No wheezing. Abdominal:      General: Bowel sounds are normal. There is no distension. Palpations: Abdomen is soft. There is no mass. Tenderness: There is no abdominal tenderness. There is no guarding. Musculoskeletal: Normal range of motion. Lymphadenopathy:      Cervical: No cervical adenopathy.          ASSESSMENT and PLAN  Orders Placed This Encounter    TESTOSTERONE, TOTAL, ADULT MALE    CBC WITH AUTOMATED DIFF    METABOLIC PANEL, COMPREHENSIVE    LIPID PANEL    REFERRAL TO ALLERGY    ALPRAZolam (XANAX) 1 mg tablet    carvediloL (COREG) 25 mg tablet    hydroCHLOROthiazide (HYDRODIURIL) 25 mg tablet    venlafaxine-SR (EFFEXOR-XR) 75 mg capsule     Diagnoses and all orders for this visit:    1. Essential hypertension  -     CBC WITH AUTOMATED DIFF; Future  -     METABOLIC PANEL, COMPREHENSIVE; Future    2. Anxiety  -     ALPRAZolam (XANAX) 1 mg tablet; One every 8 hours prn anxiety attacks    3. Chronic fatigue  -     TESTOSTERONE, TOTAL, ADULT MALE; Future    4. Hypercholesterolemia  -     LIPID PANEL; Future    5. Non-seasonal allergic rhinitis, unspecified trigger  -     REFERRAL TO ALLERGY    Other orders  -     carvediloL (COREG) 25 mg tablet; TAKE ONE TABLET BY MOUTH TWICE A DAY WITH MEALS  -     hydroCHLOROthiazide (HYDRODIURIL) 25 mg tablet; TAKE 1 TABLET BY MOUTH EVERY DAY FOR BLOOD PRESSURE  -     venlafaxine-SR (EFFEXOR-XR) 75 mg capsule; Take 1 Cap by mouth daily. For stress      Follow-up and Dispositions    · Return in about 3 months (around 8/17/2021).

## 2021-05-17 NOTE — PROGRESS NOTES
Chief Complaint   Patient presents with    Complete Physical     1. Have you been to the ER, urgent care clinic since your last visit? Hospitalized since your last visit? No    2. Have you seen or consulted any other health care providers outside of the [address] [place] [address] since your last visit? Include any pap smears or colon screening. No     Would like testosterone checked.

## 2021-05-17 NOTE — LETTER
CONTROLLED SUBSTANCE MEDICATION AGREEMENT Patient Name: Calvin Smith Patient YOB: 1975 I understand, that controlled substance medications may be used to help better manage my symptoms and to improve my ability to function at home, work and in social settings. However, I also understand that these medications do have risks, which have been discussed with me, including possible development of physical or psychological dependence. I understand that successful treatment requires mutual trust and honesty between me and my provider. I understand and agree that following this Medication Agreement is necessary in continuing my provider-patient relationship and the success of my treatment plan. Explanation from my Provider: Benefits and Goals of Controlled Substance Medications: There are two potential goals for your treatment: (1) decreased pain and suffering (2) improved daily life functions. There are many possible treatments for your chronic condition(s). Alternatives such as physical therapy, yoga, massage, home daily exercise, meditation, relaxation techniques, injections, chiropractic manipulations, surgery, cognitive therapy, hypnosis and many medications that are not habit-forming may be used. Use of controlled substance medications may be helpful, but they are unlikely to resolve all symptoms or restore all function. Explanation from my Provider: Risks of Controlled Substance Medications: 
 Opioid pain medications: These medications can lead to problems such as addiction/dependence, sedation, lightheadedness/dizziness, memory issues, falls, constipation, nausea, or vomiting. They may also impair the ability to drive or operate machinery. Additionally, these medications may lower testosterone levels, leading to loss of bone strength, stamina and sex drive.   They may cause problems with breathing, sleep apnea and reduced coughing, which is especially dangerous for patients with lung disease. Overdose or dangerous interactions with alcohol and other medications may occur, leading to death. Hyperalgesia may develop, which means patients receiving opioids for the treatment of pain may become more sensitive to certain painful stimuli, and in some cases, experience pain from ordinarily non-painful stimuli. Women between the ages of 14-53 who could become pregnant should carefully weigh the risks and benefits of opioids with their physicians, as these medications increase the risk of pregnancy complications, including miscarriage,  delivery and stillbirth. It is also possible for babies to be born addicted to opioids. Opioid dependence withdrawal symptoms may include; feelings of uneasiness, increased pain, irritability, belly pain, diarrhea, sweats and goose-flesh. Testosterone replacement therapy:  Potential side effects include increased risk of stroke and heart attack, blood clots, increased blood pressure, increased cholesterol, enlarged prostate, sleep apnea, irritability/aggression and other mood disorders, and decreased fertility. Carlos Alberto Avalos (1975)             Page 1 of 4    Initials:_______ Benzodiazepines and non-benzodiazepine sleep medications: These medications can lead to problems such as addiction/dependence, sedation, fatigue, lightheadedness, dizziness, incoordination, falls, depression, hallucinations, and impaired judgment, memory and concentration. The ability to drive and operate machinery may also be affected. Abnormal sleep-related behaviors have been reported, including sleepwalking, driving, making telephone calls, eating, or having sex while not fully awake. These medications can suppress breathing and worsen sleep apnea, particularly when combined with alcohol or other sedating medications, potentially leading to death. Dependence withdrawal symptoms may include tremors, anxiety, hallucinations and seizures.  
 Stimulants:  Common adverse effects include addiction/dependence, increased blood pressure and heart rate, decreased appetite, nausea, involuntary weight loss, insomnia,  irritability, and headaches. These risks may increase when these medications are combined with other stimulants, such as caffeine pills or energy drinks, certain weight loss supplements and oral decongestants. Dependence withdrawal symptoms may include depressed mood, loss of interest, suicidal thoughts, anxiety, fatigue, appetite changes and agitation. I agree and understand that I and my prescriber have the following rights and responsibilities regarding my treatment plan:  
1. MY RIGHTS: 
To be informed of my treatment and medication plan. To be an active participant in my health and wellbeing. 2. MY RESPONSIBILITY AND UNDERSTANDING FOR USE OF MEDICATIONS  I will take medications at the dose and frequency as directed. For my safety, I will not increase or change how I take my medications without the recommendation of my healthcare provider.  I will actively participate in any program recommended by my provider which may improve function, including social, physical, psychological programs.  I will not take my medications with alcohol or other drugs not prescribed to me. I understand that drinking alcohol with my medications increases the chances of side effects, including reduced breathing rate and could lead to personal injury when operating machinery.  I understand that if I have a history of substance use disorders, including alcohol or other illicit drugs, that I may be at increased risk of addiction to my medications.  I agree to notify my provider immediately if I should become pregnant so that my treatment plan can be adjusted.  
 I agree and understand that I shall only receive controlled substance medications from the prescriber that signed this agreement unless there is written agreement among other prescribers of controlled substances outlining the responsibility of the medications being prescribed.  I understand that the if the controlled medication is not helping to achieve goals, the dosage may be tapered and no longer prescribed. 3. MY RESPONSIBILITY FOR COMMUNICATION / PRESCRIPTION RENEWALS 
 I agree that all controlled substance medications that I take will be prescribed only by my provider. If another healthcare provider prescribes me medication in an emergency, I will notify my provider within seventy-two (72) hours. Lily Goff (1975)             Page 2 of 4    Initials:_______  I will arrange for refills at the prescribed interval ONLY during regular office hours. I will not ask for refills earlier than agreed, after-hours, on holidays or weekends. Refills may take up to 72 hours for processing and prescriptions to reach the pharmacy.  I will inform my other health care providers that I am taking these medications and of the existence of this [id number]. In the event of an emergency, I will provide the same information to the emergency department prescribers.  I will keep my provider updated on the pharmacy I am using for controlled medication prescription filling. 4. MY RESPONSIBILITY FOR PROTECTING MEDICATIONS  I will protect my prescriptions and medications. I understand that lost or misplaced prescriptions will not be replaced.  I will keep medications only for my own use and will not share them with others. I will keep all medications away from children.  I agree that if my medications are adjusted or discontinued, I will properly dispose of any remaining medications. I understand that I will be required to dispose of any remaining controlled medications as, directed by my prescriber, prior to being provided with any prescriptions for other controlled medications.   Medication drop box locations can be found at: [employer] 5. MY RESPONSIBILITY WITH ILLEGAL DRUGS  I will not use illegal or street drugs or another person's prescription medications not prescribed to me.  If there are identified addiction type symptoms, then referral to a program may be provided by my provider and I agree to follow through with this recommendation. 6. MY RESPONSIBILITY FOR COOPERATION WITH INVESTIGATIONS  I understand that my provider will comply with any applicable law and may discuss my use and/or possible misuse/abuse of controlled substances and alcohol, as appropriate, with any health care provider involved in my care, pharmacist, or legal authority.  I authorize my provider and pharmacy to cooperate fully with law enforcement agencies (as permitted by law) in the investigation of any possible misuse, sale, or other diversion of my controlled substances.  I agree to waive any applicable privilege or right of privacy or confidentiality with respect to these authorizations. 7. PROVIDERS RIGHT TO MONITOR FOR SAFETY: PRESCRIPTION MONITORING / DRUG TESTING 
 I consent to drug/toxicology screening and will submit to a drug screen upon my providers request to assure I am only taking the prescribed drugs for my safety monitoring. I understand that a drug screen is a laboratory test in which a sample of my urine, blood or saliva is checked to see what drugs I have been taking. This may entail an observed urine specimen, which means that a nurse or other health care provider may watch me provide urine, and I will cooperate if I am asked to provide an observed specimen. Savita March (1975)             Page 3 of 4    Initials:_______  I understand that my provider will check a copy of my State Prescription Monitoring Program () Report in order to safely prescribe medications.    
 Pill Counts: I consent to pill counts when requested. I may be asked to bring all my prescribed controlled substance medications, in their original bottles, to all of my scheduled appointments. In addition, my provider may ask me to come to the practice at any time for a random pill count. 8. TERMINATION OF THIS AGREEMENT  For my safety, my prescriber has the right to stop prescribing controlled substance medications and may end this agreement.  Conditions that may result in termination of this agreement: 
a. I do not show any improvement in pain, or my activity has not improved. b. I develop rapid tolerance or loss of improvement, as described in my treatment plan. 
c. I develop significant side effects from the medication. d. My behavior is not consistent with the responsibilities outlined above, thereby causing safety concerns to continue prescribing controlled substance medications. e. I fail to follow the terms of this agreement. f. Other:____________________________ UNDERSTANDING THIS MEDICATION AGREEMENT:   
I have read the above and have had all my questions answered. For chronic disease management, I know that my symptoms can be managed with many types of treatments. A chronic medication trial may be part of my treatment, but I must be an active participant in my care. Medication therapy is only one part of my symptom management plan. In some cases, there may be limited scientific evidence to support the chronic use of certain medications to improve symptoms and daily function. Furthermore, in certain circumstances, there may be scientific information that suggests that the use of chronic controlled substances may worsen my symptoms and increase my risk of unintentional death directly related to this medication therapy. I know that if my provider feels my risk from controlled medications is greater than my benefit, I will have my controlled substance medication(s) compassionately lowered or removed altogether. I further agree to allow this office to contact my HIPAA contact if there are concerns about my safety and use of the controlled medications. I have agreed to use the prescribed controlled substance medications to me as instructed by my provider and as stated in this Medication Agreement. My initial on each page and my signature below shows that I have read each page and I have had the opportunity to ask questions with answers provided by my provider. Patient Name (Printed): _____________________________________ Patient Signature:  ______________________   Date: _____________ Prescriber Name (Printed): ___________________________________ Prescriber Signature: _____________________  Date: _____________ Lin Friedman (1975)             Page 4 of 4

## 2021-05-18 LAB
ALBUMIN SERPL-MCNC: 4.6 G/DL (ref 4–5)
ALBUMIN/GLOB SERPL: 1.8 {RATIO} (ref 1.2–2.2)
ALP SERPL-CCNC: 75 IU/L (ref 48–121)
ALT SERPL-CCNC: 28 IU/L (ref 0–44)
AST SERPL-CCNC: 20 IU/L (ref 0–40)
BASOPHILS # BLD AUTO: 0.1 X10E3/UL (ref 0–0.2)
BASOPHILS NFR BLD AUTO: 1 %
BILIRUB SERPL-MCNC: 0.5 MG/DL (ref 0–1.2)
BUN SERPL-MCNC: 9 MG/DL (ref 6–24)
BUN/CREAT SERPL: 10 (ref 9–20)
CALCIUM SERPL-MCNC: 9.8 MG/DL (ref 8.7–10.2)
CHLORIDE SERPL-SCNC: 102 MMOL/L (ref 96–106)
CHOLEST SERPL-MCNC: 171 MG/DL (ref 100–199)
CO2 SERPL-SCNC: 25 MMOL/L (ref 20–29)
CREAT SERPL-MCNC: 0.93 MG/DL (ref 0.76–1.27)
EOSINOPHIL # BLD AUTO: 0.3 X10E3/UL (ref 0–0.4)
EOSINOPHIL NFR BLD AUTO: 3 %
ERYTHROCYTE [DISTWIDTH] IN BLOOD BY AUTOMATED COUNT: 13.7 % (ref 11.6–15.4)
GLOBULIN SER CALC-MCNC: 2.5 G/DL (ref 1.5–4.5)
GLUCOSE SERPL-MCNC: 87 MG/DL (ref 65–99)
HCT VFR BLD AUTO: 48.4 % (ref 37.5–51)
HDLC SERPL-MCNC: 44 MG/DL
HGB BLD-MCNC: 16.3 G/DL (ref 13–17.7)
IMM GRANULOCYTES # BLD AUTO: 0.1 X10E3/UL (ref 0–0.1)
IMM GRANULOCYTES NFR BLD AUTO: 1 %
IMP & REVIEW OF LAB RESULTS: NORMAL
LDLC SERPL CALC-MCNC: 89 MG/DL (ref 0–99)
LYMPHOCYTES # BLD AUTO: 2.2 X10E3/UL (ref 0.7–3.1)
LYMPHOCYTES NFR BLD AUTO: 20 %
MCH RBC QN AUTO: 29.3 PG (ref 26.6–33)
MCHC RBC AUTO-ENTMCNC: 33.7 G/DL (ref 31.5–35.7)
MCV RBC AUTO: 87 FL (ref 79–97)
MONOCYTES # BLD AUTO: 0.8 X10E3/UL (ref 0.1–0.9)
MONOCYTES NFR BLD AUTO: 7 %
NEUTROPHILS # BLD AUTO: 7.4 X10E3/UL (ref 1.4–7)
NEUTROPHILS NFR BLD AUTO: 68 %
PLATELET # BLD AUTO: 212 X10E3/UL (ref 150–450)
POTASSIUM SERPL-SCNC: 4.6 MMOL/L (ref 3.5–5.2)
PROT SERPL-MCNC: 7.1 G/DL (ref 6–8.5)
RBC # BLD AUTO: 5.57 X10E6/UL (ref 4.14–5.8)
SODIUM SERPL-SCNC: 142 MMOL/L (ref 134–144)
TESTOST SERPL-MCNC: 238 NG/DL (ref 264–916)
TRIGL SERPL-MCNC: 226 MG/DL (ref 0–149)
VLDLC SERPL CALC-MCNC: 38 MG/DL (ref 5–40)
WBC # BLD AUTO: 10.8 X10E3/UL (ref 3.4–10.8)

## 2021-05-18 RX ORDER — HYDROCHLOROTHIAZIDE 25 MG/1
TABLET ORAL
Qty: 90 TAB | Refills: 3 | Status: SHIPPED | OUTPATIENT
Start: 2021-05-18 | End: 2022-06-13

## 2021-05-24 ENCOUNTER — TELEPHONE (OUTPATIENT)
Dept: FAMILY MEDICINE CLINIC | Age: 46
End: 2021-05-24

## 2021-05-27 ENCOUNTER — CLINICAL SUPPORT (OUTPATIENT)
Dept: FAMILY MEDICINE CLINIC | Age: 46
End: 2021-05-27

## 2021-05-27 DIAGNOSIS — E34.9 HYPOTESTOSTERONEMIA: Primary | ICD-10-CM

## 2021-05-27 DIAGNOSIS — R53.82 CHRONIC FATIGUE: ICD-10-CM

## 2021-05-28 LAB — TESTOST SERPL-MCNC: 295 NG/DL (ref 264–916)

## 2021-06-03 DIAGNOSIS — E34.9 HYPOTESTOSTERONEMIA: Primary | ICD-10-CM

## 2021-06-03 RX ORDER — TESTOSTERONE 20.25 MG/1.25G
GEL TOPICAL
Qty: 1 BOTTLE | Refills: 5 | Status: SHIPPED | OUTPATIENT
Start: 2021-06-03 | End: 2021-06-23 | Stop reason: ALTCHOICE

## 2021-06-23 DIAGNOSIS — E34.9 HYPOTESTOSTERONEMIA: Primary | ICD-10-CM

## 2021-06-28 ENCOUNTER — TELEPHONE (OUTPATIENT)
Dept: FAMILY MEDICINE CLINIC | Age: 46
End: 2021-06-28

## 2021-06-28 NOTE — TELEPHONE ENCOUNTER
----- Message from [facility] sent at 6/28/2021  1:12 PM EDT -----  Regarding: MD Cummins/telephone  General Message/Vendor Calls    Caller's first and last name:N/A      Reason for call:Medication PA       Callback required yes/no and why:Y      Best contact number(s):[phone]      Details to clarify the request:Patient stated that the doctor send a medication over to the pharmacy however, the pharmacy stated that he needed a PA on the medication from the doctor. Patient stated that the pharmacy also tried reaching out.       Lucero Salcedo

## 2021-08-17 ENCOUNTER — OFFICE VISIT (OUTPATIENT)
Dept: FAMILY MEDICINE CLINIC | Age: 46
End: 2021-08-17
Payer: COMMERCIAL

## 2021-08-17 VITALS
TEMPERATURE: 97.5 F | SYSTOLIC BLOOD PRESSURE: 141 MMHG | DIASTOLIC BLOOD PRESSURE: 85 MMHG | HEIGHT: 71 IN | RESPIRATION RATE: 16 BRPM | OXYGEN SATURATION: 96 % | BODY MASS INDEX: 44.1 KG/M2 | WEIGHT: 315 LBS | HEART RATE: 75 BPM

## 2021-08-17 DIAGNOSIS — E66.01 OBESITY, CLASS III, BMI 40-49.9 (MORBID OBESITY) (HCC): ICD-10-CM

## 2021-08-17 DIAGNOSIS — E34.9 HYPOTESTOSTERONEMIA: Primary | ICD-10-CM

## 2021-08-17 PROCEDURE — 96372 THER/PROPH/DIAG INJ SC/IM: CPT | Performed by: FAMILY MEDICINE

## 2021-08-17 PROCEDURE — 99213 OFFICE O/P EST LOW 20 MIN: CPT | Performed by: FAMILY MEDICINE

## 2021-08-17 RX ORDER — TESTOSTERONE CYPIONATE 200 MG/ML
200 INJECTION INTRAMUSCULAR ONCE
Qty: 1 ML | Refills: 0
Start: 2021-08-17 | End: 2021-08-17

## 2021-08-17 RX ORDER — TESTOSTERONE CYPIONATE 100 MG/ML
200 INJECTION, SOLUTION INTRAMUSCULAR ONCE
Status: DISCONTINUED | OUTPATIENT
Start: 2021-08-17 | End: 2021-08-17

## 2021-08-17 RX ORDER — TESTOSTERONE CYPIONATE 200 MG/ML
200 INJECTION INTRAMUSCULAR ONCE
Qty: 1 VIAL | Refills: 0 | Status: CANCELLED
Start: 2021-08-17 | End: 2021-08-17

## 2021-08-17 NOTE — PROGRESS NOTES
Chief Complaint   Patient presents with    Hypertension    Follow-up     1. Have you been to the ER, urgent care clinic since your last visit? Hospitalized since your last visit? No    2. Have you seen or consulted any other health care providers outside of the [address] [place] [address] since your last visit? Include any pap smears or colon screening. No     Patient here for follow up on new meds. Patch will not stick so therefore testosterone patch not effective. Would like to discuss injections.     Verbal Order received from Dr. Barbara Spencer  for testosterone 200 mg  given IM  in left buttock

## 2021-08-17 NOTE — PROGRESS NOTES
HISTORY OF PRESENT ILLNESS  Sheila Escobar is a 40 y.o. male. HPI In for recheck. Says that moods are better on effexor, doesn't feel nearly as stressed out as he used to be. Not nearly as irritable. Also has problems keeping the androgel patch on, would like to switch to the shots. Still gets anxious at times. Has a 6, 13 and 23year old at home, all girls. ROS    Physical Exam  Vitals and nursing note reviewed. Constitutional:       Appearance: He is well-developed. HENT:      Right Ear: External ear normal.      Left Ear: External ear normal.   Neck:      Thyroid: No thyromegaly. Cardiovascular:      Rate and Rhythm: Normal rate and regular rhythm. Heart sounds: Normal heart sounds. Pulmonary:      Effort: Pulmonary effort is normal. No respiratory distress. Breath sounds: Normal breath sounds. No wheezing. Abdominal:      General: Bowel sounds are normal. There is no distension. Palpations: Abdomen is soft. There is no mass. Tenderness: There is no abdominal tenderness. There is no guarding. Musculoskeletal:         General: Normal range of motion. Lymphadenopathy:      Cervical: No cervical adenopathy. ASSESSMENT and PLAN  Orders Placed This Encounter    IN INJ TESTOSTERONE CYPIONATE () - Qty - 200    THER/PROPH/DIAG INJECTION, SUBCUT/IM    DISCONTD: testosterone cypionate (DEPO-TESTOSTERONE) IM injection oil 200 mg    testosterone cypionate (DEPOTESTOTERONE CYPIONATE) 200 mg/mL injection     Diagnoses and all orders for this visit:    1. Hypotestosteronemia  -     testosterone cypionate (DEPOTESTOTERONE CYPIONATE) 200 mg/mL injection; 1 mL by IntraMUSCular route once for 1 dose. Max Daily Amount: 200 mg.  -     IN INJ TESTOSTERONE CYPIONATE  -     IN THER/PROPH/DIAG INJECTION, SUBCUT/IM    2. Obesity, Class III, BMI 40-49.9 (morbid obesity) ([facility] [address]      Follow-up and Dispositions    · Return in about 3 months (around 11/17/2021).

## 2021-08-31 ENCOUNTER — CLINICAL SUPPORT (OUTPATIENT)
Dept: FAMILY MEDICINE CLINIC | Age: 46
End: 2021-08-31
Payer: COMMERCIAL

## 2021-08-31 DIAGNOSIS — E34.9 HYPOTESTOSTERONEMIA: Primary | ICD-10-CM

## 2021-08-31 PROCEDURE — 96372 THER/PROPH/DIAG INJ SC/IM: CPT | Performed by: FAMILY MEDICINE

## 2021-08-31 PROCEDURE — 99213 OFFICE O/P EST LOW 20 MIN: CPT | Performed by: FAMILY MEDICINE

## 2021-08-31 RX ORDER — TESTOSTERONE CYPIONATE 200 MG/ML
200 INJECTION INTRAMUSCULAR ONCE
Qty: 1 ML | Refills: 0
Start: 2021-08-31 | End: 2021-08-31

## 2021-08-31 NOTE — PROGRESS NOTES
Chief Complaint   Patient presents with    Immunization/Injection     testosterone      1. Have you been to the ER, urgent care clinic since your last visit? Hospitalized since your last visit? No    2. Have you seen or consulted any other health care providers outside of the [address] [place] [address] since your last visit? Include any pap smears or colon screening. No    Call placed to pt. Verified patient with two type of identifiers. requesting testosterone injection    After obtaining consent, and per orders of , tesosterone 200mg/ml given to  Left glut IM  . Patient instructed to remain in clinic for 15 minutes afterwards, and to report any adverse reaction to me immediately.  Patient did not have any adverse reactions during this office visit

## 2021-09-08 DIAGNOSIS — F41.9 ANXIETY: ICD-10-CM

## 2021-09-08 RX ORDER — ALPRAZOLAM 1 MG/1
TABLET ORAL
Qty: 50 TABLET | Refills: 2 | Status: SHIPPED | OUTPATIENT
Start: 2021-09-08 | End: 2021-12-08

## 2021-09-14 ENCOUNTER — OFFICE VISIT (OUTPATIENT)
Dept: FAMILY MEDICINE CLINIC | Age: 46
End: 2021-09-14
Payer: COMMERCIAL

## 2021-09-14 DIAGNOSIS — E34.9 HYPOTESTOSTERONEMIA: Primary | ICD-10-CM

## 2021-09-14 PROCEDURE — 96372 THER/PROPH/DIAG INJ SC/IM: CPT | Performed by: FAMILY MEDICINE

## 2021-09-14 RX ORDER — TESTOSTERONE CYPIONATE 200 MG/ML
200 INJECTION INTRAMUSCULAR ONCE
Qty: 1 ML | Refills: 0
Start: 2021-09-14 | End: 2021-09-14

## 2021-09-14 NOTE — PROGRESS NOTES
Chief Complaint   Patient presents with    Injection       Verbal Order with Readback given by Colonel Abdon MD for Testosterone Cypionate 200 mg. Given in Right Gluteus without difficulty.  Pt monitored for 15 minutes with no reaction

## 2021-09-28 ENCOUNTER — CLINICAL SUPPORT (OUTPATIENT)
Dept: FAMILY MEDICINE CLINIC | Age: 46
End: 2021-09-28
Payer: COMMERCIAL

## 2021-09-28 VITALS — DIASTOLIC BLOOD PRESSURE: 73 MMHG | TEMPERATURE: 98.2 F | SYSTOLIC BLOOD PRESSURE: 139 MMHG | HEART RATE: 77 BPM

## 2021-09-28 DIAGNOSIS — E34.9 HYPOTESTOSTERONEMIA: Primary | ICD-10-CM

## 2021-09-28 PROCEDURE — 96372 THER/PROPH/DIAG INJ SC/IM: CPT | Performed by: FAMILY MEDICINE

## 2021-09-28 RX ORDER — TESTOSTERONE CYPIONATE 200 MG/ML
200 INJECTION INTRAMUSCULAR ONCE
Qty: 1 ML | Refills: 0
Start: 2021-09-28 | End: 2021-09-28

## 2021-09-28 NOTE — PROGRESS NOTES
Chief Complaint   Patient presents with    Injection       Verbal Order with Readback given by Sosa Diaz MD for Testosterone Cypionate 200 mg. Given in Left Gluteus without difficulty.  Pt monitored for 15 minutes with no reaction

## 2021-10-12 ENCOUNTER — CLINICAL SUPPORT (OUTPATIENT)
Dept: FAMILY MEDICINE CLINIC | Age: 46
End: 2021-10-12
Payer: COMMERCIAL

## 2021-10-12 DIAGNOSIS — E34.9 HYPOTESTOSTERONEMIA: Primary | ICD-10-CM

## 2021-10-12 PROCEDURE — 96372 THER/PROPH/DIAG INJ SC/IM: CPT | Performed by: FAMILY MEDICINE

## 2021-10-12 RX ORDER — TESTOSTERONE CYPIONATE 200 MG/ML
200 INJECTION INTRAMUSCULAR ONCE
Qty: 1 ML | Refills: 0 | Status: SHIPPED | COMMUNITY
Start: 2021-10-12 | End: 2021-10-12

## 2021-10-26 ENCOUNTER — CLINICAL SUPPORT (OUTPATIENT)
Dept: FAMILY MEDICINE CLINIC | Age: 46
End: 2021-10-26
Payer: COMMERCIAL

## 2021-10-26 VITALS — TEMPERATURE: 98.5 F | HEART RATE: 61 BPM | DIASTOLIC BLOOD PRESSURE: 80 MMHG | SYSTOLIC BLOOD PRESSURE: 133 MMHG

## 2021-10-26 DIAGNOSIS — E34.9 HYPOTESTOSTERONEMIA: Primary | ICD-10-CM

## 2021-10-26 PROCEDURE — 96372 THER/PROPH/DIAG INJ SC/IM: CPT | Performed by: FAMILY MEDICINE

## 2021-10-26 RX ORDER — TESTOSTERONE CYPIONATE 200 MG/ML
200 INJECTION INTRAMUSCULAR ONCE
Qty: 1 ML | Refills: 0
Start: 2021-10-26 | End: 2021-10-26

## 2021-10-26 NOTE — PROGRESS NOTES
Chief Complaint   Patient presents with    Injection       Verbal Order with Readback given by David Mcgowan MD for Testosterone Cypionate 200 mg. Given in Left Gluteus without difficulty.  Pt monitored for 15 minutes with no reaction

## 2021-11-09 ENCOUNTER — CLINICAL SUPPORT (OUTPATIENT)
Dept: FAMILY MEDICINE CLINIC | Age: 46
End: 2021-11-09
Payer: COMMERCIAL

## 2021-11-09 VITALS — HEART RATE: 87 BPM | DIASTOLIC BLOOD PRESSURE: 85 MMHG | SYSTOLIC BLOOD PRESSURE: 139 MMHG | TEMPERATURE: 97.5 F

## 2021-11-09 DIAGNOSIS — E34.9 HYPOTESTOSTERONEMIA: Primary | ICD-10-CM

## 2021-11-09 PROCEDURE — 96372 THER/PROPH/DIAG INJ SC/IM: CPT | Performed by: FAMILY MEDICINE

## 2021-11-09 RX ORDER — TESTOSTERONE CYPIONATE 200 MG/ML
200 INJECTION INTRAMUSCULAR ONCE
Qty: 1 ML | Refills: 0
Start: 2021-11-09 | End: 2021-11-09

## 2021-11-09 NOTE — PROGRESS NOTES
Chief Complaint   Patient presents with    Injection       Verbal Order with Readback given by Mary Morales MD for Testosterone Cypionate 200 mg. Given in Right Gluteus without difficulty.  Pt monitored for 15 minutes with no reaction

## 2021-11-16 ENCOUNTER — OFFICE VISIT (OUTPATIENT)
Dept: FAMILY MEDICINE CLINIC | Age: 46
End: 2021-11-16
Payer: COMMERCIAL

## 2021-11-16 VITALS
HEIGHT: 71 IN | DIASTOLIC BLOOD PRESSURE: 85 MMHG | BODY MASS INDEX: 44.1 KG/M2 | WEIGHT: 315 LBS | RESPIRATION RATE: 16 BRPM | SYSTOLIC BLOOD PRESSURE: 142 MMHG | HEART RATE: 70 BPM | TEMPERATURE: 97.8 F | OXYGEN SATURATION: 98 %

## 2021-11-16 DIAGNOSIS — E34.9 HYPOTESTOSTERONEMIA: Primary | ICD-10-CM

## 2021-11-16 PROCEDURE — 99213 OFFICE O/P EST LOW 20 MIN: CPT | Performed by: FAMILY MEDICINE

## 2021-11-16 NOTE — PROGRESS NOTES
HISTORY OF PRESENT ILLNESS  Jac Davis is a 40 y.o. male. HPI In for followup. Has been on testosterone shots for 2 months. Was doing the patches, but was having problems with them staying on. Started patches in early August. Feels like has a better attitude, not as swain, more energetic at work. Also thinks that effexor has helped. hasnt had covid shots. In today for cholesterol cbc, testosterone level. ROS    Physical Exam  Vitals and nursing note reviewed. Constitutional:       Appearance: He is well-developed. HENT:      Right Ear: External ear normal.      Left Ear: External ear normal.   Neck:      Thyroid: No thyromegaly. Cardiovascular:      Rate and Rhythm: Normal rate and regular rhythm. Heart sounds: Normal heart sounds. Pulmonary:      Effort: Pulmonary effort is normal. No respiratory distress. Breath sounds: Normal breath sounds. No wheezing. Abdominal:      General: Bowel sounds are normal. There is no distension. Palpations: Abdomen is soft. There is no mass. Tenderness: There is no abdominal tenderness. There is no guarding. Musculoskeletal:         General: Normal range of motion. Lymphadenopathy:      Cervical: No cervical adenopathy. ASSESSMENT and PLAN  Orders Placed This Encounter    CBC WITH AUTOMATED DIFF    LIPID PANEL    TESTOSTERONE, TOTAL, ADULT MALE     Diagnoses and all orders for this visit:    1. Hypotestosteronemia  -     CBC WITH AUTOMATED DIFF; Future  -     LIPID PANEL;  Future  -     TESTOSTERONE, TOTAL, ADULT MALE; Future

## 2021-11-16 NOTE — PROGRESS NOTES
Chief Complaint   Patient presents with    Medication Evaluation    Follow-up     1. Have you been to the ER, urgent care clinic since your last visit? Hospitalized since your last visit? No    2. Have you seen or consulted any other health care providers outside of the [address] [place] [address] since your last visit? Include any pap smears or colon screening.  No

## 2021-11-17 LAB
BASOPHILS # BLD AUTO: 0.1 X10E3/UL (ref 0–0.2)
BASOPHILS NFR BLD AUTO: 1 %
CHOLEST SERPL-MCNC: 176 MG/DL (ref 100–199)
EOSINOPHIL # BLD AUTO: 0.3 X10E3/UL (ref 0–0.4)
EOSINOPHIL NFR BLD AUTO: 3 %
ERYTHROCYTE [DISTWIDTH] IN BLOOD BY AUTOMATED COUNT: 13.5 % (ref 11.6–15.4)
HCT VFR BLD AUTO: 49.3 % (ref 37.5–51)
HDLC SERPL-MCNC: 39 MG/DL
HGB BLD-MCNC: 16.9 G/DL (ref 13–17.7)
IMM GRANULOCYTES # BLD AUTO: 0.1 X10E3/UL (ref 0–0.1)
IMM GRANULOCYTES NFR BLD AUTO: 1 %
IMP & REVIEW OF LAB RESULTS: NORMAL
LDLC SERPL CALC-MCNC: 93 MG/DL (ref 0–99)
LYMPHOCYTES # BLD AUTO: 1.9 X10E3/UL (ref 0.7–3.1)
LYMPHOCYTES NFR BLD AUTO: 19 %
MCH RBC QN AUTO: 29.8 PG (ref 26.6–33)
MCHC RBC AUTO-ENTMCNC: 34.3 G/DL (ref 31.5–35.7)
MCV RBC AUTO: 87 FL (ref 79–97)
MONOCYTES # BLD AUTO: 0.9 X10E3/UL (ref 0.1–0.9)
MONOCYTES NFR BLD AUTO: 9 %
NEUTROPHILS # BLD AUTO: 6.7 X10E3/UL (ref 1.4–7)
NEUTROPHILS NFR BLD AUTO: 67 %
PLATELET # BLD AUTO: 207 X10E3/UL (ref 150–450)
RBC # BLD AUTO: 5.67 X10E6/UL (ref 4.14–5.8)
TESTOST SERPL-MCNC: 668 NG/DL (ref 264–916)
TRIGL SERPL-MCNC: 262 MG/DL (ref 0–149)
VLDLC SERPL CALC-MCNC: 44 MG/DL (ref 5–40)
WBC # BLD AUTO: 10 X10E3/UL (ref 3.4–10.8)

## 2021-12-07 ENCOUNTER — TELEPHONE (OUTPATIENT)
Dept: FAMILY MEDICINE CLINIC | Age: 46
End: 2021-12-07

## 2021-12-07 ENCOUNTER — CLINICAL SUPPORT (OUTPATIENT)
Dept: FAMILY MEDICINE CLINIC | Age: 46
End: 2021-12-07
Payer: COMMERCIAL

## 2021-12-07 DIAGNOSIS — E34.9 HYPOTESTOSTERONEMIA: Primary | ICD-10-CM

## 2021-12-07 PROCEDURE — 96372 THER/PROPH/DIAG INJ SC/IM: CPT | Performed by: FAMILY MEDICINE

## 2021-12-07 RX ORDER — TESTOSTERONE CYPIONATE 200 MG/ML
200 INJECTION INTRAMUSCULAR ONCE
Qty: 1 ML | Refills: 0
Start: 2021-12-07 | End: 2021-12-07

## 2021-12-07 NOTE — PROGRESS NOTES
Chief Complaint   Patient presents with    Injection       Verbal Order with Readback given by Yoselin Isaacs MD for Testosterone Cypionate 200 mg. Given in Left Gluteus without difficulty.  Pt monitored for 15 minutes with no reaction

## 2021-12-08 DIAGNOSIS — F41.9 ANXIETY: ICD-10-CM

## 2021-12-08 RX ORDER — ALPRAZOLAM 1 MG/1
TABLET ORAL
Qty: 50 TABLET | Refills: 2 | Status: SHIPPED | OUTPATIENT
Start: 2021-12-08 | End: 2022-04-25

## 2022-02-01 RX ORDER — CARVEDILOL 25 MG/1
TABLET ORAL
Qty: 180 TABLET | Refills: 1 | Status: SHIPPED | OUTPATIENT
Start: 2022-02-01

## 2022-03-19 PROBLEM — E66.01 OBESITY, MORBID (HCC): Status: ACTIVE | Noted: 2018-08-21

## 2022-03-19 PROBLEM — R42 DIZZINESS: Status: ACTIVE | Noted: 2017-01-17

## 2022-05-23 RX ORDER — VENLAFAXINE HYDROCHLORIDE 75 MG/1
CAPSULE, EXTENDED RELEASE ORAL
Qty: 90 CAPSULE | Refills: 5 | Status: SHIPPED | OUTPATIENT
Start: 2022-05-23

## 2022-06-13 RX ORDER — HYDROCHLOROTHIAZIDE 25 MG/1
TABLET ORAL
Qty: 90 TABLET | Refills: 3 | Status: SHIPPED | OUTPATIENT
Start: 2022-06-13 | End: 2022-06-13 | Stop reason: SDUPTHER

## 2022-06-13 RX ORDER — HYDROCHLOROTHIAZIDE 25 MG/1
TABLET ORAL
Qty: 90 TABLET | Refills: 3 | Status: SHIPPED | OUTPATIENT
Start: 2022-06-13

## 2022-06-13 NOTE — TELEPHONE ENCOUNTER
Last visit:11/16/21  Next visit:not scheduled  Previous refill 90+3R    Requested Prescriptions     Pending Prescriptions Disp Refills    hydroCHLOROthiazide (HYDRODIURIL) 25 mg tablet 90 Tablet 3     Sig: TAKE ONE TABLET BY MOUTH ONE TIME DAILY FOR BLOOD PRESSURE     [facility] Pharm[facility] Tracking Only     Intervention Detail: New Rx: 1, reason: Patient Preference   Time Spent (min): 5

## 2022-11-22 DIAGNOSIS — F41.9 ANXIETY: ICD-10-CM

## 2022-11-22 RX ORDER — ALPRAZOLAM 1 MG/1
TABLET ORAL
Qty: 50 TABLET | Refills: 2 | Status: SHIPPED | OUTPATIENT
Start: 2022-11-22

## 2022-11-29 RX ORDER — CARVEDILOL 25 MG/1
TABLET ORAL
Qty: 180 TABLET | Refills: 1 | Status: SHIPPED | OUTPATIENT
Start: 2022-11-29

## 2023-03-06 ENCOUNTER — DOCUMENTATION ONLY (OUTPATIENT)
Dept: FAMILY MEDICINE CLINIC | Age: 48
End: 2023-03-06

## 2023-03-14 ENCOUNTER — DOCUMENTATION ONLY (OUTPATIENT)
Dept: FAMILY MEDICINE CLINIC | Age: 48
End: 2023-03-14

## 2023-04-06 ENCOUNTER — DOCUMENTATION ONLY (OUTPATIENT)
Dept: FAMILY MEDICINE CLINIC | Age: 48
End: 2023-04-06

## 2023-08-11 ENCOUNTER — OFFICE VISIT (OUTPATIENT)
Age: 48
End: 2023-08-11
Payer: MEDICAID

## 2023-08-11 VITALS
SYSTOLIC BLOOD PRESSURE: 134 MMHG | DIASTOLIC BLOOD PRESSURE: 98 MMHG | RESPIRATION RATE: 16 BRPM | WEIGHT: 277 LBS | HEART RATE: 100 BPM | OXYGEN SATURATION: 96 % | TEMPERATURE: 98.5 F | HEIGHT: 71 IN | BODY MASS INDEX: 38.78 KG/M2

## 2023-08-11 DIAGNOSIS — K63.2 FISTULA OF LARGE INTESTINE: ICD-10-CM

## 2023-08-11 DIAGNOSIS — R10.84 GENERALIZED ABDOMINAL PAIN: Primary | ICD-10-CM

## 2023-08-11 DIAGNOSIS — C77.2 COLON CANCER METASTASIZED TO INTRA-ABDOMINAL LYMPH NODE (HCC): ICD-10-CM

## 2023-08-11 DIAGNOSIS — I10 ESSENTIAL HYPERTENSION: ICD-10-CM

## 2023-08-11 DIAGNOSIS — C18.9 COLON CANCER METASTASIZED TO INTRA-ABDOMINAL LYMPH NODE (HCC): ICD-10-CM

## 2023-08-11 DIAGNOSIS — F41.9 ANXIETY: ICD-10-CM

## 2023-08-11 PROCEDURE — 99214 OFFICE O/P EST MOD 30 MIN: CPT | Performed by: FAMILY MEDICINE

## 2023-08-11 PROCEDURE — 3075F SYST BP GE 130 - 139MM HG: CPT | Performed by: FAMILY MEDICINE

## 2023-08-11 PROCEDURE — 3080F DIAST BP >= 90 MM HG: CPT | Performed by: FAMILY MEDICINE

## 2023-08-11 RX ORDER — CARVEDILOL 25 MG/1
TABLET ORAL
Qty: 180 TABLET | Refills: 2 | Status: SHIPPED | OUTPATIENT
Start: 2023-08-11

## 2023-08-11 RX ORDER — HYDROCHLOROTHIAZIDE 25 MG/1
TABLET ORAL
Qty: 90 TABLET | Refills: 2 | Status: SHIPPED | OUTPATIENT
Start: 2023-08-11

## 2023-08-11 RX ORDER — ALPRAZOLAM 1 MG/1
TABLET ORAL
Qty: 90 TABLET | Status: CANCELLED | OUTPATIENT
Start: 2023-08-11

## 2023-08-11 RX ORDER — OXYCODONE HYDROCHLORIDE 5 MG/1
5 TABLET ORAL EVERY 8 HOURS PRN
Qty: 90 TABLET | Refills: 0 | Status: SHIPPED | OUTPATIENT
Start: 2023-08-11 | End: 2023-09-10

## 2023-08-11 RX ORDER — BUSPIRONE HYDROCHLORIDE 10 MG/1
10 TABLET ORAL 2 TIMES DAILY
Qty: 60 TABLET | Refills: 2 | Status: SHIPPED | OUTPATIENT
Start: 2023-08-11 | End: 2023-11-09

## 2023-08-11 RX ORDER — VENLAFAXINE HYDROCHLORIDE 75 MG/1
CAPSULE, EXTENDED RELEASE ORAL
Qty: 90 CAPSULE | Refills: 2 | Status: SHIPPED | OUTPATIENT
Start: 2023-08-11

## 2023-08-11 SDOH — ECONOMIC STABILITY: FOOD INSECURITY: WITHIN THE PAST 12 MONTHS, THE FOOD YOU BOUGHT JUST DIDN'T LAST AND YOU DIDN'T HAVE MONEY TO GET MORE.: NEVER TRUE

## 2023-08-11 SDOH — ECONOMIC STABILITY: FOOD INSECURITY: WITHIN THE PAST 12 MONTHS, YOU WORRIED THAT YOUR FOOD WOULD RUN OUT BEFORE YOU GOT MONEY TO BUY MORE.: NEVER TRUE

## 2023-08-11 SDOH — ECONOMIC STABILITY: INCOME INSECURITY: HOW HARD IS IT FOR YOU TO PAY FOR THE VERY BASICS LIKE FOOD, HOUSING, MEDICAL CARE, AND HEATING?: SOMEWHAT HARD

## 2023-08-11 SDOH — ECONOMIC STABILITY: HOUSING INSECURITY
IN THE LAST 12 MONTHS, WAS THERE A TIME WHEN YOU DID NOT HAVE A STEADY PLACE TO SLEEP OR SLEPT IN A SHELTER (INCLUDING NOW)?: NO

## 2023-08-11 ASSESSMENT — PATIENT HEALTH QUESTIONNAIRE - PHQ9
SUM OF ALL RESPONSES TO PHQ QUESTIONS 1-9: 0
SUM OF ALL RESPONSES TO PHQ QUESTIONS 1-9: 0
SUM OF ALL RESPONSES TO PHQ9 QUESTIONS 1 & 2: 0
2. FEELING DOWN, DEPRESSED OR HOPELESS: 0
SUM OF ALL RESPONSES TO PHQ QUESTIONS 1-9: 0
1. LITTLE INTEREST OR PLEASURE IN DOING THINGS: 0
SUM OF ALL RESPONSES TO PHQ QUESTIONS 1-9: 0

## 2023-08-11 NOTE — PROGRESS NOTES
Mariella Joe (:  1975) is a 53 y.o. male,Established patient, here for evaluation of the following chief complaint(s):  Hypertension and Follow-up         ASSESSMENT/PLAN:  1. Generalized abdominal pain  -     oxyCODONE (ROXICODONE) 5 MG immediate release tablet; Take 1 tablet by mouth every 8 hours as needed for Pain for up to 30 days. Intended supply: 3 days. Take lowest dose possible to manage pain Max Daily Amount: 15 mg, Disp-90 tablet, R-0Normal  2. Essential hypertension  -     carvedilol (COREG) 25 MG tablet; TAKE ONE TABLET BY MOUTH TWICE A DAY WITH MEAL, Disp-180 tablet, R-2Normal  -     hydroCHLOROthiazide (HYDRODIURIL) 25 MG tablet; TAKE ONE TABLET BY MOUTH ONE TIME DAILY FOR BLOOD PRESSURE, Disp-90 tablet, R-2Normal  -     Basic Metabolic Panel; Future  -     CBC with Auto Differential; Future  3. Colon cancer metastasized to intra-abdominal lymph node ([address])  4. Fistula of large intestine  5. Anxiety  -     venlafaxine (EFFEXOR XR) 75 MG extended release capsule; TAKE ONE CAPSULE BY MOUTH ONE TIME DAILY FOR STRESS, Disp-90 capsule, R-2Normal  -     busPIRone (BUSPAR) 10 MG tablet; Take 1 tablet by mouth 2 times daily As needed for stress, Disp-60 tablet, R-2Normal      Return in about 4 months (around 2023). Subjective   SUBJECTIVE/OBJECTIVE:  HPI In for followup. Has been dxed with colon cancer since last seen. Wound up having 8 operations. Was dxed after having a colonoic perforation. Is stil on chemotherapy. Tumor had gone to the lymph nodes. Not currently working. Was in hospital half of December and all of January. Still has a hole in stomach. 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 Review of Systems       Objective   Physical Exam  Cardiovascular:      Rate and Rhythm: Normal rate and regular rhythm. Heart sounds: Normal heart sounds. No murmur heard.   Pulmonary:      Effort: Pulmonary effort is normal.      Breath

## 2023-08-14 LAB
BASOPHILS # BLD AUTO: 0 X10E3/UL (ref 0–0.2)
BASOPHILS NFR BLD AUTO: 0 %
BUN SERPL-MCNC: 13 MG/DL (ref 6–24)
BUN/CREAT SERPL: 18 (ref 9–20)
CALCIUM SERPL-MCNC: 9.6 MG/DL (ref 8.7–10.2)
CHLORIDE SERPL-SCNC: 93 MMOL/L (ref 96–106)
CO2 SERPL-SCNC: 24 MMOL/L (ref 20–29)
CREAT SERPL-MCNC: 0.71 MG/DL (ref 0.76–1.27)
EGFRCR SERPLBLD CKD-EPI 2021: 114 ML/MIN/1.73
EOSINOPHIL # BLD AUTO: 0 X10E3/UL (ref 0–0.4)
EOSINOPHIL NFR BLD AUTO: 1 %
ERYTHROCYTE [DISTWIDTH] IN BLOOD BY AUTOMATED COUNT: 16.6 % (ref 11.6–15.4)
GLUCOSE SERPL-MCNC: 226 MG/DL (ref 70–99)
HCT VFR BLD AUTO: 41 % (ref 37.5–51)
HGB BLD-MCNC: 13.4 G/DL (ref 13–17.7)
IMM GRANULOCYTES # BLD AUTO: 0 X10E3/UL (ref 0–0.1)
IMM GRANULOCYTES NFR BLD AUTO: 1 %
LYMPHOCYTES # BLD AUTO: 1.4 X10E3/UL (ref 0.7–3.1)
LYMPHOCYTES NFR BLD AUTO: 16 %
MCH RBC QN AUTO: 25.6 PG (ref 26.6–33)
MCHC RBC AUTO-ENTMCNC: 32.7 G/DL (ref 31.5–35.7)
MCV RBC AUTO: 78 FL (ref 79–97)
MONOCYTES # BLD AUTO: 0.6 X10E3/UL (ref 0.1–0.9)
MONOCYTES NFR BLD AUTO: 7 %
NEUTROPHILS # BLD AUTO: 6.6 X10E3/UL (ref 1.4–7)
NEUTROPHILS NFR BLD AUTO: 75 %
PLATELET # BLD AUTO: 216 X10E3/UL (ref 150–450)
POTASSIUM SERPL-SCNC: 4 MMOL/L (ref 3.5–5.2)
RBC # BLD AUTO: 5.23 X10E6/UL (ref 4.14–5.8)
SODIUM SERPL-SCNC: 133 MMOL/L (ref 134–144)
WBC # BLD AUTO: 8.7 X10E3/UL (ref 3.4–10.8)

## 2023-08-23 NOTE — PROGRESS NOTES
Pc with pt.glucose 226. Thinks that this is secondary to tpn. Will start metformin 250 mg bid. Recheck in one month,  will check a1c. Has also developed a PE, and is now on eliquis.

## 2023-09-05 DIAGNOSIS — R10.84 GENERALIZED ABDOMINAL PAIN: ICD-10-CM

## 2023-09-05 NOTE — TELEPHONE ENCOUNTER
For Pharmacy Admin Tracking Only    Program: Medication Refill  CPA in place:    Recommendation Provided To:    Intervention Detail: New Rx: 1, reason: Patient Preference  Intervention Accepted By:   Cristina Washington Closed?:    Time Spent (min): 5

## 2023-09-06 NOTE — TELEPHONE ENCOUNTER
----- Message from Leslie Quinton sent at 9/6/2023  8:14 AM EDT -----  Subject: Refill Request    QUESTIONS  Name of Medication? oxyCODONE (ROXICODONE) 5 MG immediate release tablet  Patient-reported dosage and instructions? 5 MG  How many days do you have left? 2  Preferred Pharmacy? [address] phone number (if available)? [phone]  Additional Information for Provider? Pt of Lazarus Mealy requesting   refill on medication. Prefers [facility] pharmacy.   ---------------------------------------------------------------------------  --------------  Miguel Ángel LEYVA  What is the best way for the office to contact you? OK to leave message on   voicemail  Preferred Call Back Phone Number? [phone]  ---------------------------------------------------------------------------  --------------  SCRIPT ANSWERS  Relationship to Patient?  Self

## 2023-09-06 NOTE — TELEPHONE ENCOUNTER
Last appointment: 8/11/23  Next appointment: 12/11/23  Previous refill encounter(s): 8/11/23 #90    Requested Prescriptions     Pending Prescriptions Disp Refills    oxyCODONE (ROXICODONE) 5 MG immediate release tablet 90 tablet 0     Sig: Take 1 tablet by mouth every 8 hours as needed for Pain for up to 30 days. Intended supply: 3 days.  Take lowest dose possible to manage pain Max Daily Amount: 15 mg

## 2023-09-07 RX ORDER — OXYCODONE HYDROCHLORIDE 5 MG/1
5 TABLET ORAL EVERY 8 HOURS PRN
Qty: 90 TABLET | Refills: 0 | Status: SHIPPED | OUTPATIENT
Start: 2023-09-07 | End: 2023-10-07

## 2023-11-08 DIAGNOSIS — R10.84 GENERALIZED ABDOMINAL PAIN: ICD-10-CM

## 2023-11-08 RX ORDER — OXYCODONE HYDROCHLORIDE 5 MG/1
5 TABLET ORAL EVERY 8 HOURS PRN
Qty: 90 TABLET | Refills: 0 | OUTPATIENT
Start: 2023-11-08 | End: 2023-12-08

## 2023-11-08 NOTE — TELEPHONE ENCOUNTER
----- Message from Chaz Gonzalez sent at 11/8/2023  8:14 AM EST -----  Subject: Refill Request    QUESTIONS  Name of Medication? oxyCODONE (ROXICODONE) 5 MG immediate release tablet  Patient-reported dosage and instructions? 2 a day   How many days do you have left? 4  Preferred Pharmacy? [address] phone number (if available)? [phone]  ---------------------------------------------------------------------------  --------------  CALL BACK INFO  What is the best way for the office to contact you? OK to leave message on   voicemail  Preferred Call Back Phone Number? [phone]  ---------------------------------------------------------------------------  --------------  SCRIPT ANSWERS  Relationship to Patient?  Self

## 2023-11-08 NOTE — TELEPHONE ENCOUNTER
Last office visit 8/11/23. Next office visit 12/11/23. Need contract update.   Last fill date 10/17/23 number #[phone] no refills

## 2023-11-14 DIAGNOSIS — R10.84 GENERALIZED ABDOMINAL PAIN: ICD-10-CM

## 2023-11-14 RX ORDER — OXYCODONE HYDROCHLORIDE 5 MG/1
5 TABLET ORAL EVERY 8 HOURS PRN
Qty: 90 TABLET | Refills: 0 | Status: SHIPPED | OUTPATIENT
Start: 2023-11-14 | End: 2023-11-15 | Stop reason: SDUPTHER

## 2023-11-14 NOTE — TELEPHONE ENCOUNTER
Patient would lke to get the medication oxyCODONE (ROXICODONE) 5 MG immediate release tablet .   If any questions please give him a call @ [phone]

## 2023-11-15 ENCOUNTER — CLINICAL DOCUMENTATION (OUTPATIENT)
Age: 48
End: 2023-11-15

## 2023-11-15 RX ORDER — OXYCODONE HYDROCHLORIDE 5 MG/1
5 TABLET ORAL EVERY 8 HOURS PRN
Qty: 90 TABLET | Refills: 0 | Status: SHIPPED | OUTPATIENT
Start: 2023-11-15 | End: 2023-12-15

## 2023-11-15 NOTE — TELEPHONE ENCOUNTER
Patient is requesting this be sent to [facility]. Previous Rx was sent to [facility].    Last appointment: 8/11/23  Next appointment: 12/11/23    Requested Prescriptions     Pending Prescriptions Disp Refills    oxyCODONE (ROXICODONE) 5 MG immediate release tablet 90 tablet 0     Sig: Take 1 tablet by mouth every 8 hours as needed for Pain for up to 30 days. Intended supply: 7 days. Take lowest dose possible to manage pain Max Daily Amount: 15 mg         For Pharmacy Admin Tracking Only    Program: Medication Refill  CPA in place:    Recommendation Provided To:    Intervention Detail: New Rx: 1, reason: Patient Preference  Intervention Accepted By:   Joanie Cheng Closed?:    Time Spent (min): 5

## 2023-11-15 NOTE — TELEPHONE ENCOUNTER
----- Message from Shannon Pod sent at 11/14/2023  4:05 PM EST -----  Subject: Refill Request    QUESTIONS  Name of Medication? oxyCODONE (ROXICODONE) 5 MG immediate release tablet  Patient-reported dosage and instructions? 1 to 2 times daily  How many days do you have left? 0  Preferred Pharmacy? [address] phone number (if available)? [phone]  ---------------------------------------------------------------------------  --------------  CALL BACK INFO  What is the best way for the office to contact you? OK to leave message on   voicemail  Preferred Call Back Phone Number? [phone]  ---------------------------------------------------------------------------  --------------  SCRIPT ANSWERS  Relationship to Patient?  Self

## 2023-11-16 ENCOUNTER — TELEPHONE (OUTPATIENT)
Age: 48
End: 2023-11-16

## 2023-12-11 ENCOUNTER — OFFICE VISIT (OUTPATIENT)
Age: 48
End: 2023-12-11
Payer: MEDICAID

## 2023-12-11 VITALS
HEART RATE: 79 BPM | WEIGHT: 281.8 LBS | RESPIRATION RATE: 20 BRPM | OXYGEN SATURATION: 97 % | TEMPERATURE: 98.3 F | DIASTOLIC BLOOD PRESSURE: 83 MMHG | BODY MASS INDEX: 39.45 KG/M2 | HEIGHT: 71 IN | SYSTOLIC BLOOD PRESSURE: 124 MMHG

## 2023-12-11 DIAGNOSIS — I10 ESSENTIAL HYPERTENSION: Primary | ICD-10-CM

## 2023-12-11 DIAGNOSIS — C18.9 MALIGNANT NEOPLASM OF COLON, UNSPECIFIED PART OF COLON (HCC): ICD-10-CM

## 2023-12-11 DIAGNOSIS — Z12.5 PROSTATE CANCER SCREENING: ICD-10-CM

## 2023-12-11 DIAGNOSIS — E13.9 OTHER SPECIFIED DIABETES MELLITUS WITHOUT COMPLICATION, WITHOUT LONG-TERM CURRENT USE OF INSULIN (HCC): ICD-10-CM

## 2023-12-11 DIAGNOSIS — E78.00 HYPERCHOLESTEROLEMIA: ICD-10-CM

## 2023-12-11 PROCEDURE — 3079F DIAST BP 80-89 MM HG: CPT | Performed by: FAMILY MEDICINE

## 2023-12-11 PROCEDURE — 99214 OFFICE O/P EST MOD 30 MIN: CPT | Performed by: FAMILY MEDICINE

## 2023-12-11 PROCEDURE — 3074F SYST BP LT 130 MM HG: CPT | Performed by: FAMILY MEDICINE

## 2023-12-11 SDOH — ECONOMIC STABILITY: FOOD INSECURITY: WITHIN THE PAST 12 MONTHS, YOU WORRIED THAT YOUR FOOD WOULD RUN OUT BEFORE YOU GOT MONEY TO BUY MORE.: NEVER TRUE

## 2023-12-11 SDOH — ECONOMIC STABILITY: INCOME INSECURITY: HOW HARD IS IT FOR YOU TO PAY FOR THE VERY BASICS LIKE FOOD, HOUSING, MEDICAL CARE, AND HEATING?: NOT HARD AT ALL

## 2023-12-11 SDOH — ECONOMIC STABILITY: FOOD INSECURITY: WITHIN THE PAST 12 MONTHS, THE FOOD YOU BOUGHT JUST DIDN'T LAST AND YOU DIDN'T HAVE MONEY TO GET MORE.: NEVER TRUE

## 2023-12-11 ASSESSMENT — PATIENT HEALTH QUESTIONNAIRE - PHQ9
SUM OF ALL RESPONSES TO PHQ QUESTIONS 1-9: 0
1. LITTLE INTEREST OR PLEASURE IN DOING THINGS: 0
2. FEELING DOWN, DEPRESSED OR HOPELESS: 0
SUM OF ALL RESPONSES TO PHQ9 QUESTIONS 1 & 2: 0
SUM OF ALL RESPONSES TO PHQ QUESTIONS 1-9: 0

## 2023-12-11 NOTE — PROGRESS NOTES
Chief Complaint   Patient presents with    Follow-up       1. \"Have you been to the ER, urgent care clinic since your last visit? Hospitalized since your last visit? \" Yes    2. \"Have you seen or consulted any other health care providers outside of the [address] [place] [address] since your last visit? \" Yes     3. For patients aged 43-73: Has the patient had a colonoscopy / FIT/ Cologuard?  No          Health Maintenance Due   Topic Date Due    Hepatitis B vaccine (1 of 3 - 3-dose series) Never done    COVID-19 Vaccine (1) Never done    Pneumococcal 0-64 years Vaccine (1 - PCV) Never done    HIV screen  Never done    Hepatitis C screen  Never done    Shingles vaccine (1 of 2) Never done    Diabetes screen  Never done    DTaP/Tdap/Td vaccine (1 - Tdap) 07/26/2011    Colorectal Cancer Screen  Never done    Flu vaccine (1) 08/01/2023

## 2023-12-11 NOTE — PROGRESS NOTES
Danilo Cordova (:  1975) is a 53 y.o. male,Established patient, here for evaluation of the following chief complaint(s):  Follow-up         ASSESSMENT/PLAN:  1. Essential hypertension  -     CBC with Auto Differential; Future  -     Comprehensive Metabolic Panel; Future  2. Other specified diabetes mellitus without complication, without long-term current use of insulin (HCC)  -     Hemoglobin A1C; Future  3. Hypercholesterolemia  -     Lipid Panel; Future  4. Prostate cancer screening  -     PSA Screening; Future  5. Malignant neoplasm of colon, unspecified part of colon ([address])      Return in about 6 months (around 2024). Subjective   SUBJECTIVE/OBJECTIVE:  HPI In for followup. Has been on TPN since April. Is going for surgery in January to have fistula removed and have colon attached to rectum. Has had an open draining fistula since April. Still has moderate pain, feels like something is trying to push thru skin. Finished chemo in October. Tolerated them reasonably well. Also needs blood pressure and diabetes checked. No complaints of chest pain, shortness of breath, TIAs, claudication or edema. Still has some pain in fingers and feet from chemo, not bad enough at present to requirs medicine. Not interested in flu or covid vaccines. Review of Systems       Objective   Physical Exam  Cardiovascular:      Rate and Rhythm: Normal rate and regular rhythm. Heart sounds: Normal heart sounds. No murmur heard. Pulmonary:      Effort: Pulmonary effort is normal.      Breath sounds: Normal breath sounds. Abdominal:      General: Abdomen is flat. Bowel sounds are normal.      Palpations: Abdomen is soft. Tenderness: Right CVA tenderness: on cancer. Comments: Bandage over coloabdominal wall fistula. Musculoskeletal:      Right lower leg: No edema. Left lower leg: No edema. An electronic signature was used to authenticate this note.     --Filemon Rodas MD

## 2023-12-12 LAB
ALBUMIN SERPL-MCNC: 4.1 G/DL (ref 4.1–5.1)
ALBUMIN/GLOB SERPL: 1.8 {RATIO} (ref 1.2–2.2)
ALP SERPL-CCNC: 76 IU/L (ref 44–121)
ALT SERPL-CCNC: 14 IU/L (ref 0–44)
AST SERPL-CCNC: 18 IU/L (ref 0–40)
BASOPHILS # BLD AUTO: 0.1 X10E3/UL (ref 0–0.2)
BASOPHILS NFR BLD AUTO: 0 %
BILIRUB SERPL-MCNC: 0.4 MG/DL (ref 0–1.2)
BUN SERPL-MCNC: 22 MG/DL (ref 6–24)
BUN/CREAT SERPL: 31 (ref 9–20)
CALCIUM SERPL-MCNC: 9 MG/DL (ref 8.7–10.2)
CHLORIDE SERPL-SCNC: 100 MMOL/L (ref 96–106)
CHOLEST SERPL-MCNC: 169 MG/DL (ref 100–199)
CO2 SERPL-SCNC: 23 MMOL/L (ref 20–29)
CREAT SERPL-MCNC: 0.71 MG/DL (ref 0.76–1.27)
EGFRCR SERPLBLD CKD-EPI 2021: 114 ML/MIN/1.73
EOSINOPHIL # BLD AUTO: 0.2 X10E3/UL (ref 0–0.4)
EOSINOPHIL NFR BLD AUTO: 1 %
ERYTHROCYTE [DISTWIDTH] IN BLOOD BY AUTOMATED COUNT: 16.6 % (ref 11.6–15.4)
GLOBULIN SER CALC-MCNC: 2.3 G/DL (ref 1.5–4.5)
GLUCOSE SERPL-MCNC: 129 MG/DL (ref 70–99)
HBA1C MFR BLD: 6 % (ref 4.8–5.6)
HCT VFR BLD AUTO: 36.5 % (ref 37.5–51)
HDLC SERPL-MCNC: 36 MG/DL
HGB BLD-MCNC: 11.7 G/DL (ref 13–17.7)
IMM GRANULOCYTES # BLD AUTO: 0 X10E3/UL (ref 0–0.1)
IMM GRANULOCYTES NFR BLD AUTO: 0 %
IMP & REVIEW OF LAB RESULTS: NORMAL
LDLC SERPL CALC-MCNC: 103 MG/DL (ref 0–99)
LYMPHOCYTES # BLD AUTO: 1.2 X10E3/UL (ref 0.7–3.1)
LYMPHOCYTES NFR BLD AUTO: 11 %
MCH RBC QN AUTO: 25.8 PG (ref 26.6–33)
MCHC RBC AUTO-ENTMCNC: 32.1 G/DL (ref 31.5–35.7)
MCV RBC AUTO: 80 FL (ref 79–97)
MONOCYTES # BLD AUTO: 1.1 X10E3/UL (ref 0.1–0.9)
MONOCYTES NFR BLD AUTO: 10 %
NEUTROPHILS # BLD AUTO: 9 X10E3/UL (ref 1.4–7)
NEUTROPHILS NFR BLD AUTO: 78 %
PLATELET # BLD AUTO: 241 X10E3/UL (ref 150–450)
POTASSIUM SERPL-SCNC: 4.4 MMOL/L (ref 3.5–5.2)
PROT SERPL-MCNC: 6.4 G/DL (ref 6–8.5)
PSA SERPL-MCNC: 1.4 NG/ML (ref 0–4)
RBC # BLD AUTO: 4.54 X10E6/UL (ref 4.14–5.8)
SODIUM SERPL-SCNC: 135 MMOL/L (ref 134–144)
TRIGL SERPL-MCNC: 172 MG/DL (ref 0–149)
VLDLC SERPL CALC-MCNC: 30 MG/DL (ref 5–40)
WBC # BLD AUTO: 11.6 X10E3/UL (ref 3.4–10.8)

## 2024-01-10 DIAGNOSIS — R10.84 GENERALIZED ABDOMINAL PAIN: Primary | ICD-10-CM

## 2024-01-10 RX ORDER — OXYCODONE HYDROCHLORIDE 5 MG/1
5 TABLET ORAL EVERY 8 HOURS PRN
Qty: 90 TABLET | Refills: 0 | Status: SHIPPED | OUTPATIENT
Start: 2024-01-10 | End: 2024-01-12 | Stop reason: SDUPTHER

## 2024-01-10 NOTE — TELEPHONE ENCOUNTER
Last appointment: 12/11/23  Next appointment: 6/11/24  Previous refill encounter(s): 11/15/23 #90    Requested Prescriptions     Pending Prescriptions Disp Refills    oxyCODONE (ROXICODONE) 5 MG immediate release tablet 90 tablet 0     Sig: Take 1 tablet by mouth every 8 hours as needed for Pain for up to 30 days. Max Daily Amount: 15 mg         For Pharmacy Admin Tracking Only    Program: Medication Refill  CPA in place:    Recommendation Provided To:   Intervention Detail: New Rx: 1, reason: Patient Preference  Intervention Accepted By:   Gap Closed?:    Time Spent (min): 5

## 2024-01-10 NOTE — TELEPHONE ENCOUNTER
----- Message from Flor Choi MA sent at 1/10/2024 10:05 AM EST -----  Subject: Refill Request    QUESTIONS  Name of Medication? oxyCODONE (ROXICODONE) 5 MG immediate release tablet  Patient-reported dosage and instructions? 5 MG, BID   How many days do you have left? 0  Preferred Pharmacy? [id number]  Pharmacy phone number (if available)? [phone]  ---------------------------------------------------------------------------  --------------  CALL BACK INFO  What is the best way for the office to contact you? Do not leave any   message, patient will call back for answer  Preferred Call Back Phone Number? [phone]  ---------------------------------------------------------------------------  --------------  SCRIPT ANSWERS  Relationship to Patient? Self

## 2024-01-12 DIAGNOSIS — R10.84 GENERALIZED ABDOMINAL PAIN: ICD-10-CM

## 2024-01-12 RX ORDER — OXYCODONE HYDROCHLORIDE 5 MG/1
5 TABLET ORAL EVERY 8 HOURS PRN
Qty: 90 TABLET | Refills: 0 | Status: SHIPPED | OUTPATIENT
Start: 2024-01-12 | End: 2024-02-11

## 2024-01-12 NOTE — TELEPHONE ENCOUNTER
[facility] is unable to fill the Oxy due to backorder. Patient is requesting this be sent to [facility].    Requested Prescriptions     Pending Prescriptions Disp Refills    oxyCODONE (ROXICODONE) 5 MG immediate release tablet 90 tablet 0     Sig: Take 1 tablet by mouth every 8 hours as needed for Pain for up to 30 days. Max Daily Amount: 15 mg         For Pharmacy Admin Tracking Only    Program: Medication Refill  CPA in place:    Recommendation Provided To:   Intervention Detail: New Rx: 1, reason: Patient Preference  Intervention Accepted By:   Gap Closed?:    Time Spent (min): 5

## 2024-01-12 NOTE — TELEPHONE ENCOUNTER
Patient is requesting a RX  oxyCODONE (ROXICODONE) 5 MG immediate release tablet can he send it to [facility] pharmacy on [address] he can be reached @ [phone]

## 2024-01-23 ENCOUNTER — TELEPHONE (OUTPATIENT)
Age: 49
End: 2024-01-23

## 2024-01-23 NOTE — TELEPHONE ENCOUNTER
Please call Radha at [facility] [phone] to give verbal order for Home Health Evaluation and Treatment.

## 2024-01-24 ENCOUNTER — TELEPHONE (OUTPATIENT)
Age: 49
End: 2024-01-24

## 2024-01-24 NOTE — TELEPHONE ENCOUNTER
----- Message from Jazmine Clark sent at 1/24/2024  9:55 AM EST -----  Subject: Message to Provider    QUESTIONS  Information for Provider? Laila REDMOND At [facility] called in to verify   patient's provider will follow up and complete home health orders for the   patient. Contact phone number for Trem[place]ne is [phone] to let her know   provider will be signing. That is all they need to know to proceed.   ---------------------------------------------------------------------------  --------------  CALL BACK INFO  [phone]; OK to leave message on voicemail  ---------------------------------------------------------------------------  --------------  SCRIPT ANSWERS  Relationship to Patient? Covered Entity  Covered Entity Type? Home Health Care?  Representative Name? Laila REDMOND At [facility]

## 2024-01-24 NOTE — TELEPHONE ENCOUNTER
Called number in message; went to a physician orders triage voice mail.  Left the details the required along with the call back number so that we can initiate verbal order per Dr. Bauer.

## 2024-01-24 NOTE — TELEPHONE ENCOUNTER
Scott mcmillan  77    [facility] home health care calling  [place] [phone]  Wanting to verify the [facility] orders for this patient  Plz call her

## 2024-01-25 ENCOUNTER — TELEPHONE (OUTPATIENT)
Age: 49
End: 2024-01-25

## 2024-01-25 NOTE — TELEPHONE ENCOUNTER
Per Dr. Bauer verbal order was given for Home Health Care to Tremayne at [facility]. Contact information confirmed.

## 2024-01-25 NOTE — TELEPHONE ENCOUNTER
[facility] was given verbal order per Dr. Bauer for Home Health. Tremayne verified contact information for Dr. Bauer. No other questions or concerns.

## 2024-02-08 DIAGNOSIS — R10.84 GENERALIZED ABDOMINAL PAIN: ICD-10-CM

## 2024-02-08 NOTE — TELEPHONE ENCOUNTER
----- Message from Jyoti Read sent at 2/8/2024 10:26 AM EST -----  Subject: Refill Request    QUESTIONS  Name of Medication? oxyCODONE (ROXICODONE) 5 MG immediate release tablet  Patient-reported dosage and instructions? 3 to 4 times a day   How many days do you have left? 0  Preferred Pharmacy? [id number]  Pharmacy phone number (if available)? [phone]  ---------------------------------------------------------------------------  --------------  CALL BACK INFO  What is the best way for the office to contact you? OK to leave message on   voicemail  Preferred Call Back Phone Number? [phone]  ---------------------------------------------------------------------------  --------------  SCRIPT ANSWERS  Relationship to Patient? Self

## 2024-02-08 NOTE — TELEPHONE ENCOUNTER
Last appointment: 12/11/23  Next appointment: 6/11/24  Previous refill encounter(s): 1/12/24 30 d/s    Requested Prescriptions     Pending Prescriptions Disp Refills    oxyCODONE (ROXICODONE) 5 MG immediate release tablet 90 tablet 0     Sig: Take 1 tablet by mouth every 8 hours as needed for Pain for up to 30 days. Max Daily Amount: 15 mg         For Pharmacy Admin Tracking Only    Program: Medication Refill  CPA in place:    Recommendation Provided To:   Intervention Detail: New Rx: 1, reason: Patient Preference  Intervention Accepted By:   Gap Closed?:    Time Spent (min): 5

## 2024-02-12 RX ORDER — OXYCODONE HYDROCHLORIDE 5 MG/1
5 TABLET ORAL EVERY 8 HOURS PRN
Qty: 90 TABLET | Refills: 0 | Status: SHIPPED | OUTPATIENT
Start: 2024-02-12 | End: 2024-03-18 | Stop reason: SDUPTHER

## 2024-03-04 DIAGNOSIS — R10.84 GENERALIZED ABDOMINAL PAIN: ICD-10-CM

## 2024-03-04 NOTE — TELEPHONE ENCOUNTER
Patient is requesting a RX refill  oxyCODONE (ROXICODONE) 5 MG immediate release tablet he can be reached @ [phone]

## 2024-03-06 RX ORDER — OXYCODONE HYDROCHLORIDE 5 MG/1
5 TABLET ORAL EVERY 8 HOURS PRN
Qty: 90 TABLET | Refills: 0 | OUTPATIENT
Start: 2024-03-06 | End: 2024-04-05

## 2024-03-07 NOTE — TELEPHONE ENCOUNTER
----- Message from Joanne Andrade sent at 3/7/2024  3:09 PM EST -----  Subject: Refill Request    QUESTIONS  Name of Medication? oxyCODONE (ROXICODONE) 5 MG immediate release tablet  Patient-reported dosage and instructions? Take 1 tablet by mouth every 8   hours as needed for Pain for up to 30 days. Max Daily Amount? 15 mg  How many days do you have left? 0  Preferred Pharmacy? [id number]  Pharmacy phone number (if available)? [phone]  ---------------------------------------------------------------------------  --------------  CALL BACK INFO  What is the best way for the office to contact you? OK to leave message on   voicemail  Preferred Call Back Phone Number? [phone]  ---------------------------------------------------------------------------  --------------  SCRIPT ANSWERS  Relationship to Patient? Self

## 2024-03-11 DIAGNOSIS — R10.84 GENERALIZED ABDOMINAL PAIN: ICD-10-CM

## 2024-03-11 RX ORDER — OXYCODONE HYDROCHLORIDE 5 MG/1
5 TABLET ORAL EVERY 8 HOURS PRN
Qty: 90 TABLET | Refills: 0 | OUTPATIENT
Start: 2024-03-11 | End: 2024-04-10

## 2024-03-11 NOTE — TELEPHONE ENCOUNTER
Patient is requesting a RX refill oxyCODONE (ROXICODONE) 5 MG immediate release tablet he can be reached @ [phone]

## 2024-03-13 DIAGNOSIS — R10.84 GENERALIZED ABDOMINAL PAIN: ICD-10-CM

## 2024-03-13 NOTE — TELEPHONE ENCOUNTER
----- Message from Shanice Miller sent at 3/13/2024  2:52 PM EDT -----  Subject: Refill Request    QUESTIONS  Name of Medication? oxyCODONE (ROXICODONE) 5 MG immediate release tablet  Patient-reported dosage and instructions? 5 mg takes 1 pill every 8 hours   How many days do you have left? 0  Preferred Pharmacy? [id number]  Pharmacy phone number (if available)? [phone]  ---------------------------------------------------------------------------  --------------  CALL BACK INFO  What is the best way for the office to contact you? OK to leave message on   voicemail  Preferred Call Back Phone Number? [phone]  ---------------------------------------------------------------------------  --------------  SCRIPT ANSWERS  Relationship to Patient? Self

## 2024-03-13 NOTE — TELEPHONE ENCOUNTER
Last appointment: 12/11/23  Next appointment: 6/11/24  Previous refill encounter(s): 2/12/24 30 d/s    Requested Prescriptions     Pending Prescriptions Disp Refills    oxyCODONE (ROXICODONE) 5 MG immediate release tablet 90 tablet 0     Sig: Take 1 tablet by mouth every 8 hours as needed for Pain for up to 30 days. Max Daily Amount: 15 mg         For Pharmacy Admin Tracking Only    Program: Medication Refill  CPA in place:    Recommendation Provided To:   Intervention Detail: New Rx: 1, reason: Patient Preference  Intervention Accepted By:   Gap Closed?:    Time Spent (min): 5

## 2024-03-15 ENCOUNTER — PATIENT MESSAGE (OUTPATIENT)
Age: 49
End: 2024-03-15

## 2024-03-18 RX ORDER — OXYCODONE HYDROCHLORIDE 5 MG/1
5 TABLET ORAL EVERY 8 HOURS PRN
Qty: 90 TABLET | Refills: 0 | Status: SHIPPED | OUTPATIENT
Start: 2024-03-18 | End: 2024-04-17 | Stop reason: SDUPTHER

## 2024-04-17 DIAGNOSIS — R10.84 GENERALIZED ABDOMINAL PAIN: ICD-10-CM

## 2024-04-17 RX ORDER — OXYCODONE HYDROCHLORIDE 5 MG/1
5 TABLET ORAL EVERY 8 HOURS PRN
Qty: 90 TABLET | Refills: 0 | Status: SHIPPED | OUTPATIENT
Start: 2024-04-17 | End: 2024-05-15 | Stop reason: SDUPTHER

## 2024-05-15 DIAGNOSIS — R10.84 GENERALIZED ABDOMINAL PAIN: ICD-10-CM

## 2024-05-15 RX ORDER — OXYCODONE HYDROCHLORIDE 5 MG/1
5 TABLET ORAL EVERY 8 HOURS PRN
Qty: 90 TABLET | Refills: 0 | Status: SHIPPED | OUTPATIENT
Start: 2024-05-15 | End: 2024-06-13 | Stop reason: SDUPTHER

## 2024-06-11 ENCOUNTER — OFFICE VISIT (OUTPATIENT)
Age: 49
End: 2024-06-11
Payer: COMMERCIAL

## 2024-06-11 VITALS
OXYGEN SATURATION: 98 % | HEIGHT: 71 IN | DIASTOLIC BLOOD PRESSURE: 76 MMHG | TEMPERATURE: 98 F | RESPIRATION RATE: 16 BRPM | WEIGHT: 259.2 LBS | SYSTOLIC BLOOD PRESSURE: 127 MMHG | HEART RATE: 61 BPM | BODY MASS INDEX: 36.29 KG/M2

## 2024-06-11 DIAGNOSIS — R10.84 GENERALIZED ABDOMINAL PAIN: ICD-10-CM

## 2024-06-11 DIAGNOSIS — I10 ESSENTIAL HYPERTENSION: ICD-10-CM

## 2024-06-11 DIAGNOSIS — F41.9 ANXIETY: ICD-10-CM

## 2024-06-11 PROCEDURE — 3074F SYST BP LT 130 MM HG: CPT | Performed by: FAMILY MEDICINE

## 2024-06-11 PROCEDURE — 3078F DIAST BP <80 MM HG: CPT | Performed by: FAMILY MEDICINE

## 2024-06-11 PROCEDURE — 99213 OFFICE O/P EST LOW 20 MIN: CPT | Performed by: FAMILY MEDICINE

## 2024-06-11 RX ORDER — VENLAFAXINE HYDROCHLORIDE 75 MG/1
CAPSULE, EXTENDED RELEASE ORAL
Qty: 90 CAPSULE | Refills: 3 | Status: SHIPPED | OUTPATIENT
Start: 2024-06-11

## 2024-06-11 RX ORDER — NAPROXEN SODIUM 220 MG
2 TABLET ORAL DAILY PRN
COMMUNITY
End: 2024-06-11 | Stop reason: ALTCHOICE

## 2024-06-11 RX ORDER — OXYCODONE HYDROCHLORIDE 5 MG/1
5 TABLET ORAL EVERY 8 HOURS PRN
Qty: 90 TABLET | Refills: 0 | Status: CANCELLED | OUTPATIENT
Start: 2024-06-11 | End: 2024-07-11

## 2024-06-11 RX ORDER — HYDROCHLOROTHIAZIDE 25 MG/1
TABLET ORAL
Qty: 90 TABLET | Refills: 3 | Status: SHIPPED | OUTPATIENT
Start: 2024-06-11

## 2024-06-11 RX ORDER — AMLODIPINE BESYLATE 10 MG/1
10 TABLET ORAL DAILY
COMMUNITY
Start: 2024-05-13 | End: 2024-06-11 | Stop reason: ALTCHOICE

## 2024-06-11 RX ORDER — CARVEDILOL 25 MG/1
TABLET ORAL
Qty: 180 TABLET | Refills: 3 | Status: SHIPPED | OUTPATIENT
Start: 2024-06-11

## 2024-06-11 RX ORDER — MULTIVITAMIN,THERAPEUTIC
1 TABLET ORAL DAILY
COMMUNITY

## 2024-06-11 ASSESSMENT — PATIENT HEALTH QUESTIONNAIRE - PHQ9
SUM OF ALL RESPONSES TO PHQ QUESTIONS 1-9: 0
SUM OF ALL RESPONSES TO PHQ QUESTIONS 1-9: 0
SUM OF ALL RESPONSES TO PHQ9 QUESTIONS 1 & 2: 0
SUM OF ALL RESPONSES TO PHQ QUESTIONS 1-9: 0
1. LITTLE INTEREST OR PLEASURE IN DOING THINGS: NOT AT ALL
2. FEELING DOWN, DEPRESSED OR HOPELESS: NOT AT ALL
SUM OF ALL RESPONSES TO PHQ QUESTIONS 1-9: 0

## 2024-06-11 NOTE — PROGRESS NOTES
No chief complaint on file.        1. \"Have you been to the ER, urgent care clinic since your last visit?  Hospitalized since your last visit?\" NO    2. \"Have you seen or consulted any other health care providers outside of the [facility] since your last visit?\" DR. SARGENT- GI  AND ONCOLOGY    3. For patients aged 45-75: Has the patient had a colonoscopy / FIT/ Cologuard? SCHEDULED      If the patient is female:    4. For patients aged 40-74: Has the patient had a mammogram within the past 2 years? N/A      5. For patients aged 21-65: Has the patient had a pap smear? N/A      Health Maintenance Due   Topic Date Due    Hepatitis B vaccine (1 of 3 - 3-dose series) Never done    COVID-19 Vaccine (1) Never done    Pneumococcal 0-64 years Vaccine (1 of 2 - PCV) Never done    HIV screen  Never done    Hepatitis C screen  Never done    Shingles vaccine (1 of 2) Never done    DTaP/Tdap/Td vaccine (1 - Tdap) 07/26/2011    Colorectal Cancer Screen  Never done

## 2024-06-11 NOTE — PROGRESS NOTES
Scott Snowden (:  1975) is a 49 y.o. male,Established patient, here for evaluation of the following chief complaint(s):  Hypertension and Follow-up Chronic Condition      Assessment & Plan   1. Generalized abdominal pain  2. Anxiety  -     venlafaxine (EFFEXOR XR) 75 MG extended release capsule; TAKE ONE CAPSULE BY MOUTH ONE TIME DAILY FOR STRESS, Disp-90 capsule, R-3Normal  3. Essential hypertension  -     carvedilol (COREG) 25 MG tablet; TAKE ONE TABLET BY MOUTH TWICE A DAY WITH MEAL, Disp-180 tablet, R-3Normal  -     hydroCHLOROthiazide (HYDRODIURIL) 25 MG tablet; TAKE ONE TABLET BY MOUTH ONE TIME DAILY FOR BLOOD PRESSURE, Disp-90 tablet, R-3Normal  Have advised pt to wait and see what pet scan shows before making a decision to return to work .    Return in about 2 months (around 2024).       Subjective   HPI has had 4 surgeries since last seen. Has had fistula closed and has colostomy reversed. Also had an ablation of a liver lesion. Has been having problems with chronic diarrhea, is on imodium 3 times a day. Had his hctz stopped. Goes for another PET scan next month. Has had chemo. Finished chemo in October. Has been doing some yard work, but still gets tired very easily. Has lost 22 lbs since last seen. Pt not really sure if he should go back to work or not.     Review of Systems       Objective   Physical Exam  Cardiovascular:      Rate and Rhythm: Normal rate and regular rhythm.      Heart sounds: Normal heart sounds. No murmur heard.  Pulmonary:      Effort: Pulmonary effort is normal.      Breath sounds: Normal breath sounds.   Abdominal:      General: Abdomen is flat. Bowel sounds are normal.      Palpations: Abdomen is soft.   Musculoskeletal:      Right lower leg: No edema.      Left lower leg: No edema.                An electronic signature was used to authenticate this note.    --Bhavesh Bauer MD

## 2024-06-13 DIAGNOSIS — R10.84 GENERALIZED ABDOMINAL PAIN: ICD-10-CM

## 2024-06-14 RX ORDER — OXYCODONE HYDROCHLORIDE 5 MG/1
5 TABLET ORAL EVERY 8 HOURS PRN
Qty: 90 TABLET | Refills: 0 | OUTPATIENT
Start: 2024-06-14 | End: 2024-07-14

## 2024-06-14 RX ORDER — OXYCODONE HYDROCHLORIDE 5 MG/1
5 TABLET ORAL EVERY 8 HOURS PRN
Qty: 90 TABLET | Refills: 0 | Status: SHIPPED | OUTPATIENT
Start: 2024-06-14 | End: 2024-07-14

## 2024-06-14 NOTE — TELEPHONE ENCOUNTER
Last appointment: 6/11/24  Next appointment: 8/14/24  Previous refill encounter(s): 5/15/24 #90    Requested Prescriptions     Pending Prescriptions Disp Refills    oxyCODONE (ROXICODONE) 5 MG immediate release tablet 90 tablet 0     Sig: Take 1 tablet by mouth every 8 hours as needed for Pain for up to 30 days. Max Daily Amount: 15 mg         For Pharmacy Admin Tracking Only    Program: Medication Refill  CPA in place:    Recommendation Provided To:   Intervention Detail: New Rx: 1, reason: Patient Preference  Intervention Accepted By:   Gap Closed?:    Time Spent (min): 5

## 2024-07-11 DIAGNOSIS — R10.84 GENERALIZED ABDOMINAL PAIN: ICD-10-CM

## 2024-07-12 RX ORDER — OXYCODONE HYDROCHLORIDE 5 MG/1
5 TABLET ORAL EVERY 8 HOURS PRN
Qty: 90 TABLET | Refills: 0 | Status: SHIPPED | OUTPATIENT
Start: 2024-07-12 | End: 2024-08-11

## 2024-08-18 ENCOUNTER — PATIENT MESSAGE (OUTPATIENT)
Age: 49
End: 2024-08-18

## 2024-08-19 DIAGNOSIS — R10.84 GENERALIZED ABDOMINAL PAIN: Primary | ICD-10-CM

## 2024-08-19 RX ORDER — OXYCODONE HYDROCHLORIDE 5 MG/1
5 TABLET ORAL EVERY 8 HOURS PRN
Qty: 90 TABLET | Refills: 0 | Status: SHIPPED | OUTPATIENT
Start: 2024-08-19 | End: 2024-09-18

## 2024-09-12 DIAGNOSIS — R10.84 GENERALIZED ABDOMINAL PAIN: ICD-10-CM

## 2024-09-12 RX ORDER — OXYCODONE HYDROCHLORIDE 5 MG/1
5 TABLET ORAL EVERY 8 HOURS PRN
Qty: 90 TABLET | Refills: 0 | OUTPATIENT
Start: 2024-09-12 | End: 2024-10-12

## 2024-09-19 ENCOUNTER — PATIENT MESSAGE (OUTPATIENT)
Age: 49
End: 2024-09-19

## 2024-09-19 DIAGNOSIS — R10.84 GENERALIZED ABDOMINAL PAIN: Primary | ICD-10-CM

## 2024-09-26 RX ORDER — OXYCODONE HYDROCHLORIDE 5 MG/1
5 TABLET ORAL EVERY 8 HOURS PRN
Qty: 90 TABLET | Refills: 0 | Status: SHIPPED | OUTPATIENT
Start: 2024-09-26 | End: 2024-10-26

## 2024-10-08 ENCOUNTER — OFFICE VISIT (OUTPATIENT)
Age: 49
End: 2024-10-08
Payer: COMMERCIAL

## 2024-10-08 VITALS
HEART RATE: 77 BPM | SYSTOLIC BLOOD PRESSURE: 127 MMHG | DIASTOLIC BLOOD PRESSURE: 73 MMHG | HEIGHT: 71 IN | TEMPERATURE: 98.4 F | RESPIRATION RATE: 16 BRPM | BODY MASS INDEX: 37.8 KG/M2 | WEIGHT: 270 LBS | OXYGEN SATURATION: 97 %

## 2024-10-08 DIAGNOSIS — C18.9 METASTATIC COLON CANCER TO LIVER (HCC): ICD-10-CM

## 2024-10-08 DIAGNOSIS — E78.00 HYPERCHOLESTEROLEMIA: ICD-10-CM

## 2024-10-08 DIAGNOSIS — C78.7 METASTATIC COLON CANCER TO LIVER (HCC): ICD-10-CM

## 2024-10-08 DIAGNOSIS — I10 ESSENTIAL HYPERTENSION: Primary | ICD-10-CM

## 2024-10-08 PROCEDURE — 3074F SYST BP LT 130 MM HG: CPT | Performed by: FAMILY MEDICINE

## 2024-10-08 PROCEDURE — 99213 OFFICE O/P EST LOW 20 MIN: CPT | Performed by: FAMILY MEDICINE

## 2024-10-08 PROCEDURE — 3078F DIAST BP <80 MM HG: CPT | Performed by: FAMILY MEDICINE

## 2024-10-08 NOTE — PROGRESS NOTES
Scott Snowden (:  1975) is a 49 y.o. male,Established patient, here for evaluation of the following chief complaint(s):  Hypertension         Assessment & Plan  Essential hypertension   Chronic, at goal (stable), continue current treatment plan    Orders:    CBC with Auto Differential; Future    Comprehensive Metabolic Panel; Future    Comprehensive Metabolic Panel    CBC with Auto Differential    Hypercholesterolemia       Orders:    Lipid Panel; Future    Lipid Panel    Metastatic colon cancer to liver (HCC)    Has appt at [facility] in next few weeks. Scheduled for an MRI of liver.            No follow-ups on file.       Subjective   HPI In for followup. [place] [facility] [place] a mildly abnormalo CT of abdomen. They are sending him for an MRI. Overall feeling well, like could go back to work, but is afraid that might have to restart chemo and go back out. . Last day worked was Dec. 2022. Still having some chronic diarrhea. Declines immunizations. Needs blood pressure and cholesterol checked. No complaints of chest pain, shortness of breath, TIAs, claudication or edema. Still having moderate R sided abdominal pain and pain in R posterior lower thoracic area.       Review of Systems       Objective   Physical Exam  Cardiovascular:      Rate and Rhythm: Normal rate and regular rhythm.      Heart sounds: Normal heart sounds. No murmur heard.  Pulmonary:      Effort: Pulmonary effort is normal.      Breath sounds: Normal breath sounds.   Abdominal:      General: Abdomen is flat. Bowel sounds are normal.      Palpations: Abdomen is soft.   Musculoskeletal:      Right lower leg: No edema.      Left lower leg: No edema.                  An electronic signature was used to authenticate this note.    --Bhavesh Bauer MD

## 2024-10-08 NOTE — PROGRESS NOTES
Chief Complaint   Patient presents with    Hypertension       \"Have you been to the ER, urgent care clinic since your last visit?  Hospitalized since your last visit?\"    NO    “Have you seen or consulted any other health care providers outside of [facility] since your last visit?”      ONCOLOGY- DR. ELISA YING AND DR. SARGENT        “Have you had a colorectal cancer screening such as a colonoscopy/FIT/Cologuard?      24    No colonoscopy on file  No cologuard on file  No FIT/FOBT on file   No flexible sigmoidoscopy on file         Click Here for Release of Records Request       Vitals:    10/08/24 1511   BP: 127/73   Pulse: 77   Resp: 16   Temp: 98.4 °F (36.9 °C)   SpO2: 97%      Health Maintenance Due   Topic Date Due    Pneumococcal 0-64 years Vaccine (1 of 2 - PCV) Never done    HIV screen  Never done    Hepatitis C screen  Never done    Hepatitis B vaccine (1 of 3 - 19+ 3-dose series) Never done    DTaP/Tdap/Td vaccine (1 - Tdap) 2011    Colorectal Cancer Screen  Never done        The patient, Scott Snowden, identity was verified by name and .

## 2024-10-09 LAB
ALBUMIN SERPL-MCNC: 4.3 G/DL (ref 4.1–5.1)
ALP SERPL-CCNC: 73 IU/L (ref 44–121)
ALT SERPL-CCNC: 22 IU/L (ref 0–44)
AST SERPL-CCNC: 17 IU/L (ref 0–40)
BASOPHILS # BLD AUTO: 0.1 X10E3/UL (ref 0–0.2)
BASOPHILS NFR BLD AUTO: 1 %
BILIRUB SERPL-MCNC: 0.5 MG/DL (ref 0–1.2)
BUN SERPL-MCNC: 9 MG/DL (ref 6–24)
BUN/CREAT SERPL: 10 (ref 9–20)
CALCIUM SERPL-MCNC: 9.4 MG/DL (ref 8.7–10.2)
CHLORIDE SERPL-SCNC: 99 MMOL/L (ref 96–106)
CHOLEST SERPL-MCNC: 137 MG/DL (ref 100–199)
CO2 SERPL-SCNC: 24 MMOL/L (ref 20–29)
CREAT SERPL-MCNC: 0.91 MG/DL (ref 0.76–1.27)
EGFRCR SERPLBLD CKD-EPI 2021: 104 ML/MIN/1.73
EOSINOPHIL # BLD AUTO: 0.2 X10E3/UL (ref 0–0.4)
EOSINOPHIL NFR BLD AUTO: 2 %
ERYTHROCYTE [DISTWIDTH] IN BLOOD BY AUTOMATED COUNT: 13.3 % (ref 11.6–15.4)
GLOBULIN SER CALC-MCNC: 2.3 G/DL (ref 1.5–4.5)
GLUCOSE SERPL-MCNC: 74 MG/DL (ref 70–99)
HCT VFR BLD AUTO: 43.4 % (ref 37.5–51)
HDLC SERPL-MCNC: 40 MG/DL
HGB BLD-MCNC: 14.6 G/DL (ref 13–17.7)
IMM GRANULOCYTES # BLD AUTO: 0.1 X10E3/UL (ref 0–0.1)
IMM GRANULOCYTES NFR BLD AUTO: 1 %
IMP & REVIEW OF LAB RESULTS: NORMAL
LDLC SERPL CALC-MCNC: 49 MG/DL (ref 0–99)
LYMPHOCYTES # BLD AUTO: 1.9 X10E3/UL (ref 0.7–3.1)
LYMPHOCYTES NFR BLD AUTO: 19 %
MCH RBC QN AUTO: 30.9 PG (ref 26.6–33)
MCHC RBC AUTO-ENTMCNC: 33.6 G/DL (ref 31.5–35.7)
MCV RBC AUTO: 92 FL (ref 79–97)
MONOCYTES # BLD AUTO: 0.9 X10E3/UL (ref 0.1–0.9)
MONOCYTES NFR BLD AUTO: 9 %
NEUTROPHILS # BLD AUTO: 6.7 X10E3/UL (ref 1.4–7)
NEUTROPHILS NFR BLD AUTO: 68 %
PLATELET # BLD AUTO: 191 X10E3/UL (ref 150–450)
POTASSIUM SERPL-SCNC: 4.1 MMOL/L (ref 3.5–5.2)
PROT SERPL-MCNC: 6.6 G/DL (ref 6–8.5)
RBC # BLD AUTO: 4.73 X10E6/UL (ref 4.14–5.8)
SODIUM SERPL-SCNC: 138 MMOL/L (ref 134–144)
TRIGL SERPL-MCNC: 314 MG/DL (ref 0–149)
VLDLC SERPL CALC-MCNC: 48 MG/DL (ref 5–40)
WBC # BLD AUTO: 9.8 X10E3/UL (ref 3.4–10.8)

## 2024-10-28 DIAGNOSIS — R10.84 GENERALIZED ABDOMINAL PAIN: Primary | ICD-10-CM

## 2024-10-28 RX ORDER — OXYCODONE HYDROCHLORIDE 5 MG/1
5 TABLET ORAL EVERY 8 HOURS PRN
Qty: 90 TABLET | Refills: 0 | Status: SHIPPED | OUTPATIENT
Start: 2024-10-28 | End: 2024-11-27

## 2024-11-25 DIAGNOSIS — R10.84 GENERALIZED ABDOMINAL PAIN: ICD-10-CM

## 2024-11-26 RX ORDER — OXYCODONE HYDROCHLORIDE 5 MG/1
5 TABLET ORAL EVERY 8 HOURS PRN
Qty: 90 TABLET | Refills: 0 | Status: SHIPPED | OUTPATIENT
Start: 2024-11-26 | End: 2024-12-26

## 2024-11-26 NOTE — TELEPHONE ENCOUNTER
Patient comment: Dr Bauer could you please feel my pain meds before weekend thanks.     Scott Snowden     Last appointment: 10/8/24  Next appointment: 2/12/25  Previous refill encounter(s): 10/28/24 #90    Requested Prescriptions     Pending Prescriptions Disp Refills    oxyCODONE (ROXICODONE) 5 MG immediate release tablet 90 tablet 0     Sig: Take 1 tablet by mouth every 8 hours as needed for Pain for up to 30 days. Take lowest dose possible to manage pain Max Daily Amount: 15 mg         For Pharmacy Admin Tracking Only    Program: Medication Refill  CPA in place:    Recommendation Provided To:   Intervention Detail: New Rx: 1, reason: Patient Preference  Intervention Accepted By:   Gap Closed?:    Time Spent (min): 5

## 2024-12-23 DIAGNOSIS — R10.84 GENERALIZED ABDOMINAL PAIN: ICD-10-CM

## 2024-12-23 RX ORDER — OXYCODONE HYDROCHLORIDE 5 MG/1
5 TABLET ORAL EVERY 8 HOURS PRN
Qty: 90 TABLET | Refills: 0 | Status: SHIPPED | OUTPATIENT
Start: 2024-12-23 | End: 2025-01-20 | Stop reason: SDUPTHER

## 2025-01-20 DIAGNOSIS — R10.84 GENERALIZED ABDOMINAL PAIN: ICD-10-CM

## 2025-01-22 DIAGNOSIS — R10.84 GENERALIZED ABDOMINAL PAIN: ICD-10-CM

## 2025-01-22 RX ORDER — OXYCODONE HYDROCHLORIDE 5 MG/1
5 TABLET ORAL EVERY 8 HOURS PRN
Qty: 90 TABLET | Refills: 0 | Status: SHIPPED | OUTPATIENT
Start: 2025-01-22 | End: 2025-02-28 | Stop reason: SDUPTHER

## 2025-01-22 RX ORDER — OXYCODONE HYDROCHLORIDE 5 MG/1
5 TABLET ORAL EVERY 8 HOURS PRN
Qty: 90 TABLET | Refills: 0 | Status: CANCELLED | OUTPATIENT
Start: 2025-01-22 | End: 2025-02-21

## 2025-02-28 DIAGNOSIS — R10.84 GENERALIZED ABDOMINAL PAIN: ICD-10-CM

## 2025-02-28 RX ORDER — OXYCODONE HYDROCHLORIDE 5 MG/1
5 TABLET ORAL EVERY 8 HOURS PRN
Qty: 90 TABLET | Refills: 0 | Status: SHIPPED | OUTPATIENT
Start: 2025-02-28 | End: 2025-03-30

## 2025-03-25 DIAGNOSIS — R10.84 GENERALIZED ABDOMINAL PAIN: ICD-10-CM

## 2025-03-25 RX ORDER — OXYCODONE HYDROCHLORIDE 5 MG/1
5 TABLET ORAL EVERY 8 HOURS PRN
Qty: 90 TABLET | Refills: 0 | Status: SHIPPED | OUTPATIENT
Start: 2025-03-25 | End: 2025-04-24

## 2025-03-25 NOTE — TELEPHONE ENCOUNTER
Last office visit with pcp 10/8/24.  Next office visit  with pcp NONE SCHEDULED . Due back around 4/8/25.  [email or web address] appt reminder sent to patient.

## 2025-04-27 ENCOUNTER — PATIENT MESSAGE (OUTPATIENT)
Age: 50
End: 2025-04-27

## 2025-04-28 DIAGNOSIS — C78.6 MALIGNANT NEOPLASM METASTATIC TO PERITONEUM (HCC): Primary | ICD-10-CM

## 2025-04-28 RX ORDER — OXYCODONE HYDROCHLORIDE 5 MG/1
5 TABLET ORAL EVERY 8 HOURS PRN
Qty: 90 TABLET | Refills: 0 | Status: SHIPPED | OUTPATIENT
Start: 2025-04-28 | End: 2025-05-28

## 2025-05-25 DIAGNOSIS — C78.6 MALIGNANT NEOPLASM METASTATIC TO PERITONEUM (HCC): ICD-10-CM

## 2025-05-28 DIAGNOSIS — C78.6 MALIGNANT NEOPLASM METASTATIC TO PERITONEUM (HCC): ICD-10-CM

## 2025-05-28 RX ORDER — OXYCODONE HYDROCHLORIDE 5 MG/1
5 TABLET ORAL EVERY 8 HOURS PRN
Qty: 90 TABLET | Refills: 0 | Status: CANCELLED | OUTPATIENT
Start: 2025-05-28 | End: 2025-06-27

## 2025-05-28 NOTE — TELEPHONE ENCOUNTER
Last appointment: 10/8/24  Next appointment: 6/4/25  Previous refill encounter(s): 4/28/25 #90    Requested Prescriptions     Pending Prescriptions Disp Refills    oxyCODONE (ROXICODONE) 5 MG immediate release tablet 90 tablet 0     Sig: Take 1 tablet by mouth every 8 hours as needed for Pain for up to 30 days. Take lowest dose possible to manage pain Max Daily Amount: 15 mg         For Pharmacy Admin Tracking Only    Program: Medication Refill  CPA in place:    Recommendation Provided To:   Intervention Detail: New Rx: 1, reason: Patient Preference  Intervention Accepted By:   Gap Closed?:    Time Spent (min): 5

## 2025-05-29 RX ORDER — OXYCODONE HYDROCHLORIDE 5 MG/1
5 TABLET ORAL EVERY 8 HOURS PRN
Qty: 90 TABLET | Refills: 0 | Status: SHIPPED | OUTPATIENT
Start: 2025-05-29 | End: 2025-06-28

## 2025-06-04 ENCOUNTER — OFFICE VISIT (OUTPATIENT)
Age: 50
End: 2025-06-04
Payer: COMMERCIAL

## 2025-06-04 DIAGNOSIS — I10 ESSENTIAL HYPERTENSION: ICD-10-CM

## 2025-06-04 PROCEDURE — 99213 OFFICE O/P EST LOW 20 MIN: CPT | Performed by: FAMILY MEDICINE

## 2025-06-04 RX ORDER — CARVEDILOL 25 MG/1
TABLET ORAL
Qty: 180 TABLET | Refills: 3 | Status: SHIPPED | OUTPATIENT
Start: 2025-06-04

## 2025-06-04 RX ORDER — APIXABAN 5 MG/1
5 TABLET, FILM COATED ORAL 2 TIMES DAILY
COMMUNITY
Start: 2025-05-04

## 2025-06-04 SDOH — ECONOMIC STABILITY: FOOD INSECURITY: WITHIN THE PAST 12 MONTHS, THE FOOD YOU BOUGHT JUST DIDN'T LAST AND YOU DIDN'T HAVE MONEY TO GET MORE.: NEVER TRUE

## 2025-06-04 SDOH — ECONOMIC STABILITY: FOOD INSECURITY: WITHIN THE PAST 12 MONTHS, YOU WORRIED THAT YOUR FOOD WOULD RUN OUT BEFORE YOU GOT MONEY TO BUY MORE.: NEVER TRUE

## 2025-06-04 ASSESSMENT — PATIENT HEALTH QUESTIONNAIRE - PHQ9
SUM OF ALL RESPONSES TO PHQ QUESTIONS 1-9: 0
1. LITTLE INTEREST OR PLEASURE IN DOING THINGS: NOT AT ALL
2. FEELING DOWN, DEPRESSED OR HOPELESS: NOT AT ALL
SUM OF ALL RESPONSES TO PHQ QUESTIONS 1-9: 0

## 2025-06-04 NOTE — PROGRESS NOTES
Scott Snowden (:  1975) is a 50 y.o. male,Established patient, here for evaluation of the following chief complaint(s):  Hypertension and Follow-up         Assessment & Plan  Essential hypertension    Continue current meds.     Orders:    carvedilol (COREG) 25 MG tablet; TAKE ONE TABLET BY MOUTH TWICE A DAY WITH MEAL      Return in about 6 months (around 2025).       Subjective   HPI  Is back to working full time. Not having to do any heavy lifting. Finished chemo in March. Had a CT scan on Monday. Needs blood pressure checked. Still gets moderate abdominal pain and neuropathy in hands and feet. Had lab at [facility] in 2025. Needs refill of carvedilol.   Review of Systems       Objective   Physical Exam  Cardiovascular:      Rate and Rhythm: Normal rate and regular rhythm.      Heart sounds: Normal heart sounds. No murmur heard.  Pulmonary:      Effort: Pulmonary effort is normal.      Breath sounds: Normal breath sounds.   Abdominal:      General: Abdomen is flat. Bowel sounds are normal.      Palpations: Abdomen is soft.   Musculoskeletal:      Right lower leg: No edema.      Left lower leg: No edema.                  An electronic signature was used to authenticate this note.    --Bhavesh Bauer MD

## 2025-06-04 NOTE — PROGRESS NOTES
Chief Complaint   Patient presents with    Hypertension    Follow-up       \"Have you been to the ER, urgent care clinic since your last visit?  Hospitalized since your last visit?\"      3/25/25 - [facility] - dislocation of finger    WEEK OR TWO AGO - BETTER MED - STYE LEFT EYE    “Have you seen or consulted any other health care providers outside of [facility] since your last visit?”      HEM/ONC - DR. ELISA YING - LAST CHEMO TREATMENT WAS 3/31/25      “Have you had a colorectal cancer screening such as a colonoscopy/FIT/Cologuard?    NO    No colonoscopy on file  No cologuard on file  No FIT/FOBT on file   No flexible sigmoidoscopy on file         Click Here for Release of Records Request       There were no vitals filed for this visit.   Health Maintenance Due   Topic Date Due    HIV screen  Never done    Hepatitis C screen  Never done    Hepatitis B vaccine (1 of 3 - 19+ 3-dose series) Never done    Shingles vaccine (1 of 2) Never done    Pneumococcal 0-49 years Vaccine (1 of 2 - PCV) Never done    Colorectal Cancer Screen  Never done    A1C test (Diabetic or Prediabetic)  2024    Depression Screen  2025        The patient, Scott Snowden, identity was verified by name and .

## 2025-06-26 DIAGNOSIS — C78.6 MALIGNANT NEOPLASM METASTATIC TO PERITONEUM (HCC): ICD-10-CM

## 2025-06-26 RX ORDER — OXYCODONE HYDROCHLORIDE 5 MG/1
5 TABLET ORAL EVERY 8 HOURS PRN
Qty: 90 TABLET | Refills: 0 | Status: SHIPPED | OUTPATIENT
Start: 2025-06-26 | End: 2025-07-22 | Stop reason: SDUPTHER

## 2025-06-27 ENCOUNTER — COMMUNITY OUTREACH (OUTPATIENT)
Age: 50
End: 2025-06-27

## 2025-07-05 DIAGNOSIS — F41.9 ANXIETY: ICD-10-CM

## 2025-07-07 ENCOUNTER — PATIENT MESSAGE (OUTPATIENT)
Age: 50
End: 2025-07-07

## 2025-07-07 DIAGNOSIS — F41.9 ANXIETY: ICD-10-CM

## 2025-07-07 RX ORDER — VENLAFAXINE HYDROCHLORIDE 75 MG/1
CAPSULE, EXTENDED RELEASE ORAL
Qty: 90 CAPSULE | Refills: 3 | Status: SHIPPED | OUTPATIENT
Start: 2025-07-07

## 2025-07-07 RX ORDER — VENLAFAXINE HYDROCHLORIDE 75 MG/1
CAPSULE, EXTENDED RELEASE ORAL
Qty: 90 CAPSULE | Refills: 3 | OUTPATIENT
Start: 2025-07-07

## 2025-07-22 DIAGNOSIS — C78.6 MALIGNANT NEOPLASM METASTATIC TO PERITONEUM (HCC): ICD-10-CM

## 2025-07-22 NOTE — TELEPHONE ENCOUNTER
Last appointment: 6/4/25  Next appointment: 12/4/25  Previous refill encounter(s): 6/26/25 #90    Requested Prescriptions     Pending Prescriptions Disp Refills    oxyCODONE (ROXICODONE) 5 MG immediate release tablet 90 tablet 0     Sig: Take 1 tablet by mouth every 8 hours as needed for Pain for up to 30 days. Take lowest dose possible to manage pain Max Daily Amount: 15 mg         For Pharmacy Admin Tracking Only    Program: Medication Refill  CPA in place:    Recommendation Provided To:   Intervention Detail: New Rx: 1, reason: Patient Preference  Intervention Accepted By:   Gap Closed?:    Time Spent (min): 5

## 2025-07-25 RX ORDER — OXYCODONE HYDROCHLORIDE 5 MG/1
5 TABLET ORAL EVERY 8 HOURS PRN
Qty: 90 TABLET | Refills: 0 | Status: SHIPPED | OUTPATIENT
Start: 2025-07-25 | End: 2025-08-24

## 2025-08-03 DIAGNOSIS — I10 ESSENTIAL HYPERTENSION: ICD-10-CM

## 2025-08-04 RX ORDER — HYDROCHLOROTHIAZIDE 25 MG/1
TABLET ORAL
Qty: 90 TABLET | Refills: 1 | Status: SHIPPED | OUTPATIENT
Start: 2025-08-04

## 2025-08-26 ENCOUNTER — PATIENT MESSAGE (OUTPATIENT)
Age: 50
End: 2025-08-26

## 2025-08-26 DIAGNOSIS — R10.84 GENERALIZED ABDOMINAL PAIN: Primary | ICD-10-CM

## 2025-08-26 RX ORDER — OXYCODONE HYDROCHLORIDE 5 MG/1
5 TABLET ORAL EVERY 8 HOURS PRN
Qty: 90 TABLET | Refills: 0 | Status: SHIPPED | OUTPATIENT
Start: 2025-08-26 | End: 2025-09-25